# Patient Record
Sex: FEMALE | Race: ASIAN | NOT HISPANIC OR LATINO | URBAN - METROPOLITAN AREA
[De-identification: names, ages, dates, MRNs, and addresses within clinical notes are randomized per-mention and may not be internally consistent; named-entity substitution may affect disease eponyms.]

---

## 2021-11-21 ENCOUNTER — INPATIENT (INPATIENT)
Facility: HOSPITAL | Age: 86
LOS: 5 days | Discharge: HOME HEALTH | End: 2021-11-27
Attending: STUDENT IN AN ORGANIZED HEALTH CARE EDUCATION/TRAINING PROGRAM | Admitting: STUDENT IN AN ORGANIZED HEALTH CARE EDUCATION/TRAINING PROGRAM
Payer: MEDICARE

## 2021-11-21 VITALS
SYSTOLIC BLOOD PRESSURE: 163 MMHG | DIASTOLIC BLOOD PRESSURE: 61 MMHG | TEMPERATURE: 98 F | HEART RATE: 69 BPM | RESPIRATION RATE: 16 BRPM | OXYGEN SATURATION: 97 %

## 2021-11-21 DIAGNOSIS — E03.9 HYPOTHYROIDISM, UNSPECIFIED: ICD-10-CM

## 2021-11-21 DIAGNOSIS — Z29.9 ENCOUNTER FOR PROPHYLACTIC MEASURES, UNSPECIFIED: ICD-10-CM

## 2021-11-21 DIAGNOSIS — F03.90 UNSPECIFIED DEMENTIA WITHOUT BEHAVIORAL DISTURBANCE: ICD-10-CM

## 2021-11-21 DIAGNOSIS — E11.9 TYPE 2 DIABETES MELLITUS WITHOUT COMPLICATIONS: ICD-10-CM

## 2021-11-21 DIAGNOSIS — I10 ESSENTIAL (PRIMARY) HYPERTENSION: ICD-10-CM

## 2021-11-21 DIAGNOSIS — I63.9 CEREBRAL INFARCTION, UNSPECIFIED: ICD-10-CM

## 2021-11-21 DIAGNOSIS — N17.9 ACUTE KIDNEY FAILURE, UNSPECIFIED: ICD-10-CM

## 2021-11-21 DIAGNOSIS — I73.9 PERIPHERAL VASCULAR DISEASE, UNSPECIFIED: ICD-10-CM

## 2021-11-21 DIAGNOSIS — Z98.890 OTHER SPECIFIED POSTPROCEDURAL STATES: Chronic | ICD-10-CM

## 2021-11-21 DIAGNOSIS — R53.1 WEAKNESS: ICD-10-CM

## 2021-11-21 DIAGNOSIS — E87.5 HYPERKALEMIA: ICD-10-CM

## 2021-11-21 LAB
ANION GAP SERPL CALC-SCNC: 13 MMOL/L — SIGNIFICANT CHANGE UP (ref 7–14)
APPEARANCE UR: CLEAR — SIGNIFICANT CHANGE UP
BILIRUB UR-MCNC: NEGATIVE — SIGNIFICANT CHANGE UP
BUN SERPL-MCNC: 40 MG/DL — HIGH (ref 7–23)
CALCIUM SERPL-MCNC: 9.2 MG/DL — SIGNIFICANT CHANGE UP (ref 8.4–10.5)
CHLORIDE SERPL-SCNC: 105 MMOL/L — SIGNIFICANT CHANGE UP (ref 98–107)
CO2 SERPL-SCNC: 21 MMOL/L — LOW (ref 22–31)
COLOR SPEC: COLORLESS — SIGNIFICANT CHANGE UP
CREAT SERPL-MCNC: 1.66 MG/DL — HIGH (ref 0.5–1.3)
DIFF PNL FLD: NEGATIVE — SIGNIFICANT CHANGE UP
GLUCOSE BLDC GLUCOMTR-MCNC: 194 MG/DL — HIGH (ref 70–99)
GLUCOSE SERPL-MCNC: 191 MG/DL — HIGH (ref 70–99)
GLUCOSE UR QL: ABNORMAL
KETONES UR-MCNC: NEGATIVE — SIGNIFICANT CHANGE UP
LEUKOCYTE ESTERASE UR-ACNC: NEGATIVE — SIGNIFICANT CHANGE UP
NITRITE UR-MCNC: NEGATIVE — SIGNIFICANT CHANGE UP
PH UR: 7 — SIGNIFICANT CHANGE UP (ref 5–8)
POTASSIUM SERPL-MCNC: 5 MMOL/L — SIGNIFICANT CHANGE UP (ref 3.5–5.3)
POTASSIUM SERPL-SCNC: 5 MMOL/L — SIGNIFICANT CHANGE UP (ref 3.5–5.3)
PROT UR-MCNC: ABNORMAL
SARS-COV-2 RNA SPEC QL NAA+PROBE: SIGNIFICANT CHANGE UP
SODIUM SERPL-SCNC: 139 MMOL/L — SIGNIFICANT CHANGE UP (ref 135–145)
SP GR SPEC: 1.02 — SIGNIFICANT CHANGE UP (ref 1–1.05)
TROPONIN T, HIGH SENSITIVITY RESULT: 16 NG/L — SIGNIFICANT CHANGE UP
UROBILINOGEN FLD QL: SIGNIFICANT CHANGE UP

## 2021-11-21 PROCEDURE — 99223 1ST HOSP IP/OBS HIGH 75: CPT

## 2021-11-21 PROCEDURE — 71250 CT THORAX DX C-: CPT | Mod: 26

## 2021-11-21 PROCEDURE — 99285 EMERGENCY DEPT VISIT HI MDM: CPT | Mod: 25

## 2021-11-21 PROCEDURE — 70498 CT ANGIOGRAPHY NECK: CPT | Mod: 26,MA

## 2021-11-21 PROCEDURE — 71045 X-RAY EXAM CHEST 1 VIEW: CPT | Mod: 26

## 2021-11-21 PROCEDURE — 93010 ELECTROCARDIOGRAM REPORT: CPT

## 2021-11-21 PROCEDURE — 70496 CT ANGIOGRAPHY HEAD: CPT | Mod: 26,MA

## 2021-11-21 RX ORDER — NIFEDIPINE 30 MG
1 TABLET, EXTENDED RELEASE 24 HR ORAL
Qty: 0 | Refills: 0 | DISCHARGE

## 2021-11-21 RX ORDER — CILOSTAZOL 100 MG/1
50 TABLET ORAL
Refills: 0 | Status: DISCONTINUED | OUTPATIENT
Start: 2021-11-21 | End: 2021-11-21

## 2021-11-21 RX ORDER — ATORVASTATIN CALCIUM 80 MG/1
40 TABLET, FILM COATED ORAL AT BEDTIME
Refills: 0 | Status: DISCONTINUED | OUTPATIENT
Start: 2021-11-21 | End: 2021-11-27

## 2021-11-21 RX ORDER — SODIUM CHLORIDE 9 MG/ML
1000 INJECTION, SOLUTION INTRAVENOUS
Refills: 0 | Status: DISCONTINUED | OUTPATIENT
Start: 2021-11-21 | End: 2021-11-27

## 2021-11-21 RX ORDER — HEPARIN SODIUM 5000 [USP'U]/ML
5000 INJECTION INTRAVENOUS; SUBCUTANEOUS EVERY 8 HOURS
Refills: 0 | Status: DISCONTINUED | OUTPATIENT
Start: 2021-11-21 | End: 2021-11-27

## 2021-11-21 RX ORDER — SITAGLIPTIN 50 MG/1
1 TABLET, FILM COATED ORAL
Qty: 0 | Refills: 0 | DISCHARGE

## 2021-11-21 RX ORDER — NIFEDIPINE 30 MG
0 TABLET, EXTENDED RELEASE 24 HR ORAL
Qty: 0 | Refills: 0 | DISCHARGE

## 2021-11-21 RX ORDER — GLUCAGON INJECTION, SOLUTION 0.5 MG/.1ML
1 INJECTION, SOLUTION SUBCUTANEOUS ONCE
Refills: 0 | Status: DISCONTINUED | OUTPATIENT
Start: 2021-11-21 | End: 2021-11-27

## 2021-11-21 RX ORDER — DEXTROSE 50 % IN WATER 50 %
25 SYRINGE (ML) INTRAVENOUS ONCE
Refills: 0 | Status: DISCONTINUED | OUTPATIENT
Start: 2021-11-21 | End: 2021-11-27

## 2021-11-21 RX ORDER — CHOLECALCIFEROL (VITAMIN D3) 125 MCG
1 CAPSULE ORAL
Qty: 0 | Refills: 0 | DISCHARGE

## 2021-11-21 RX ORDER — LEVOTHYROXINE SODIUM 125 MCG
1 TABLET ORAL
Qty: 0 | Refills: 0 | DISCHARGE

## 2021-11-21 RX ORDER — METOPROLOL TARTRATE 50 MG
0 TABLET ORAL
Qty: 0 | Refills: 0 | DISCHARGE

## 2021-11-21 RX ORDER — CILOSTAZOL 100 MG/1
1 TABLET ORAL
Qty: 0 | Refills: 0 | DISCHARGE

## 2021-11-21 RX ORDER — LEVOTHYROXINE SODIUM 125 MCG
0 TABLET ORAL
Qty: 0 | Refills: 0 | DISCHARGE

## 2021-11-21 RX ORDER — SODIUM ZIRCONIUM CYCLOSILICATE 10 G/10G
10 POWDER, FOR SUSPENSION ORAL ONCE
Refills: 0 | Status: COMPLETED | OUTPATIENT
Start: 2021-11-21 | End: 2021-11-21

## 2021-11-21 RX ORDER — SITAGLIPTIN 50 MG/1
0 TABLET, FILM COATED ORAL
Qty: 0 | Refills: 0 | DISCHARGE

## 2021-11-21 RX ORDER — LEVOTHYROXINE SODIUM 125 MCG
112 TABLET ORAL DAILY
Refills: 0 | Status: DISCONTINUED | OUTPATIENT
Start: 2021-11-22 | End: 2021-11-27

## 2021-11-21 RX ORDER — DEXTROSE 50 % IN WATER 50 %
12.5 SYRINGE (ML) INTRAVENOUS ONCE
Refills: 0 | Status: DISCONTINUED | OUTPATIENT
Start: 2021-11-21 | End: 2021-11-27

## 2021-11-21 RX ORDER — PREGABALIN 225 MG/1
1 CAPSULE ORAL
Qty: 0 | Refills: 0 | DISCHARGE

## 2021-11-21 RX ORDER — DEXTROSE 50 % IN WATER 50 %
15 SYRINGE (ML) INTRAVENOUS ONCE
Refills: 0 | Status: DISCONTINUED | OUTPATIENT
Start: 2021-11-21 | End: 2021-11-27

## 2021-11-21 RX ORDER — INSULIN LISPRO 100/ML
VIAL (ML) SUBCUTANEOUS AT BEDTIME
Refills: 0 | Status: DISCONTINUED | OUTPATIENT
Start: 2021-11-21 | End: 2021-11-27

## 2021-11-21 RX ORDER — ATORVASTATIN CALCIUM 80 MG/1
80 TABLET, FILM COATED ORAL AT BEDTIME
Refills: 0 | Status: DISCONTINUED | OUTPATIENT
Start: 2021-11-21 | End: 2021-11-21

## 2021-11-21 RX ORDER — CILOSTAZOL 100 MG/1
0 TABLET ORAL
Qty: 0 | Refills: 0 | DISCHARGE

## 2021-11-21 RX ORDER — CILOSTAZOL 100 MG/1
50 TABLET ORAL
Refills: 0 | Status: DISCONTINUED | OUTPATIENT
Start: 2021-11-21 | End: 2021-11-27

## 2021-11-21 RX ORDER — PREGABALIN 225 MG/1
1000 CAPSULE ORAL DAILY
Refills: 0 | Status: DISCONTINUED | OUTPATIENT
Start: 2021-11-22 | End: 2021-11-27

## 2021-11-21 RX ORDER — CHOLECALCIFEROL (VITAMIN D3) 125 MCG
2000 CAPSULE ORAL DAILY
Refills: 0 | Status: DISCONTINUED | OUTPATIENT
Start: 2021-11-21 | End: 2021-11-27

## 2021-11-21 RX ORDER — INSULIN LISPRO 100/ML
VIAL (ML) SUBCUTANEOUS
Refills: 0 | Status: DISCONTINUED | OUTPATIENT
Start: 2021-11-21 | End: 2021-11-27

## 2021-11-21 RX ORDER — SODIUM CHLORIDE 9 MG/ML
1000 INJECTION INTRAMUSCULAR; INTRAVENOUS; SUBCUTANEOUS ONCE
Refills: 0 | Status: COMPLETED | OUTPATIENT
Start: 2021-11-21 | End: 2021-11-21

## 2021-11-21 RX ADMIN — SODIUM ZIRCONIUM CYCLOSILICATE 10 GRAM(S): 10 POWDER, FOR SUSPENSION ORAL at 14:18

## 2021-11-21 RX ADMIN — SODIUM CHLORIDE 1000 MILLILITER(S): 9 INJECTION INTRAMUSCULAR; INTRAVENOUS; SUBCUTANEOUS at 16:45

## 2021-11-21 RX ADMIN — ATORVASTATIN CALCIUM 40 MILLIGRAM(S): 80 TABLET, FILM COATED ORAL at 22:15

## 2021-11-21 RX ADMIN — SODIUM CHLORIDE 1000 MILLILITER(S): 9 INJECTION INTRAMUSCULAR; INTRAVENOUS; SUBCUTANEOUS at 14:19

## 2021-11-21 RX ADMIN — CILOSTAZOL 50 MILLIGRAM(S): 100 TABLET ORAL at 20:41

## 2021-11-21 RX ADMIN — HEPARIN SODIUM 5000 UNIT(S): 5000 INJECTION INTRAVENOUS; SUBCUTANEOUS at 22:15

## 2021-11-21 NOTE — ED PROVIDER NOTE - OBJECTIVE STATEMENT
85 yo F DM, HTN, dementia, hypothyroidism, not on ac/ap BIBEMS with granddaughter after fall to her right this AM. family had to pick her back up into her chair. she then had some RUE weakness and dropped her spoon. granddaughter then reports she "aspirated" her eyes rolled back and she didn't answer to her name for a few minutes. in ED pt at baseline per granddaughter and answering questions.

## 2021-11-21 NOTE — ED ADULT NURSE NOTE - BEFAST FACE
Patient to apply Chlorhexadine wipes  to surgical area (as instructed) the night before procedure and the AM of procedure. Wipes provided.    Blood bank bracelet applied to patient during Pre Admission Testing visit.  Patient instructed not to remove from arm until after procedure and they are discharged from the hospital.  Explained to patient that they may shower and get the bracelet wet, but not to immerse under water for longer periods (bathing, swimming, hand dishwashing, etc).  Patient verbalized understanding.    Patient states, she has never had Rhogam injection.     
Yes

## 2021-11-21 NOTE — ED PROVIDER NOTE - CLINICAL SUMMARY MEDICAL DECISION MAKING FREE TEXT BOX
reports R sided weakness, and droop, loc. resolved be time of eval. code stroke called. at baseline ms. plan: labs, ct, neuro following.

## 2021-11-21 NOTE — ED ADULT NURSE NOTE - NS PRO PASSIVE SMOKE EXP
INDICATION: Bilateral foot pain.



AP, oblique, and lateral views of both feet are obtained.



FINDINGS: No fracture or acute bony abnormality is seen. Joint

spaces appear unremarkable.



IMPRESSION:



Negative bilateral feet.



Dictated by: 



  Dictated on workstation # [id number] Unknown

## 2021-11-21 NOTE — ED ADULT NURSE REASSESSMENT NOTE - NS ED NURSE REASSESS COMMENT FT1
Received report from gosia Hicks. Pt. a&ox4 and in no acute distress. VS as noted. ACP made aware of BP. Patient denies any Chest pain, SOB, headaches, lightheadedness, dizziness, weakness, numbness, or tingling. Respirations appear even and unlabored. NSR on cardiac monitor. Patient turned and repositioned for comfort. Medications administered as per EMAR. Patient denies any complaints at this time. Awaiting further orders. Will continue to monitor. Received report from gosia Hicks. Pt. a&ox2 at baseline ambulatory w/ one assist. VS as noted. ACP made aware of BP. Patient denies any Chest pain, SOB, headaches, lightheadedness, dizziness, weakness, numbness, or tingling. Respirations appear even and unlabored. NSR on cardiac monitor. Patient turned and repositioned for comfort. Medications administered as per EMAR. Patient denies any complaints at this time. Awaiting further orders. Will continue to monitor.

## 2021-11-21 NOTE — H&P ADULT - NSICDXPASTSURGICALHX_GEN_ALL_CORE_FT
PAST SURGICAL HISTORY:  H/O surgical procedure Right foot ORIF with metal natacha and screws in 2011

## 2021-11-21 NOTE — CONSULT NOTE ADULT - ASSESSMENT
Patient is a 85yo F RH PMHx of DM, HTN, dementia, hypothyroidism, not on ac/ap who presents to ED for R facial droop, word finding difficulty and RUE weakness    LKW: 9:45 AM 11/21/21  NIHSS: 2   Baseline MRS: 2    Not a tPA candidate due to resolving and nondisabling deficits.   Not a thrombectomy candidate due based on NIHSS    CT head showed:     Impression: symptoms 2/2 occlusion  Also highly suspicious for seizure like event.     Mechanism: ESUS    Recommendations:  [] Aspirin 81mg daily for now  [] Atorvastatin 80 mg daily (long-term goal and titrate to LDL < 70)  [] HgbA1C, fasting lipid panel, CBC, CMP, coag panel, troponin  [] MRI brain w/o con   [] TTE w/ bubble study  [] telemetry to check for arrhythmia, EKG,   [] routine EEG     - Tight glucose control (long-term goal HgbA1c < 6%)  - Neuro-checks and VS q4h  - Permissive HTN up to 220/120 for 24-48h from symptom onset  - Dysphagia screen. If fails, speech/swallow eval  - aspiration precautions  - STAT CT head non-contrast for change in neuro exam.   - PT/ OT / CM/ SW evaluations  - DVT ppx     Discussed with stroke attending Dr Rg Phillip regarding decision against candidacy for tPA/ thrombectomy. Will be formally staffed on morning rounds with attending. Recommendations will be complete once signed by attending. Patient is a 87yo F RH PMHx of DM, HTN, dementia, hypothyroidism, not on ac/ap who presents to ED for R facial droop, word finding difficulty and RUE weakness and during event became minimally responsive and with eyes rolling back. Unable to determine if new incontinence given wears diaper. No tongue biting noted. No new seizure tonic/ clonic activity noted. Has tremors at baseline. /61. CBC w/ anemia. Pending remainder of labs.    LKW: 9:45 AM 11/21/21  NIHSS: 2   Baseline MRS: 2    Not a tPA candidate due to resolving and nondisabling deficits.   Not a thrombectomy candidate due based on NIHSS    CT head showed to my eye possible chronic infarct in L centrum semiovale.     Impression: Patient with unresponsiveness, R facial droop and RUE weakness, word finding difficulty all of which have or are resolving concerning for seizure like activity vs L hemispheric dysfunction from ischemic event. Mechanism: ESUS    Recommendations:  [] Aspirin 81mg daily for now  [] Atorvastatin 80 mg daily (long-term goal and titrate to LDL < 70)  [] HgbA1C, fasting lipid panel, CBC, CMP, coag panel, troponin  [] MRI brain w/o con   [] TTE w/ bubble study  [] telemetry to check for arrhythmia, EKG,   [] vEEG 24 hr     - Tight glucose control (long-term goal HgbA1c < 6%)  - Neuro-checks and VS q4h  - Permissive HTN up to 220/120 for 24-48h from symptom onset  - Dysphagia screen. If fails, speech/swallow eval  - aspiration precautions  - STAT CT head non-contrast for change in neuro exam.   - PT/ OT / CM/ SW evaluations  - DVT ppx     Discussed with stroke attending Dr Rg Phillip regarding decision against candidacy for tPA/ thrombectomy. Will be formally staffed on morning rounds with attending. Recommendations will be complete once signed by attending. Patient is a 87yo F RH PMHx of DM, HTN, dementia, hypothyroidism, not on ac/ap who presents to ED for R facial droop, word finding difficulty and RUE weakness and during event became minimally responsive and with eyes rolling back. Unable to determine if new incontinence given wears diaper. No tongue biting noted. No new seizure tonic/ clonic activity noted. Has tremors at baseline. /61. CBC w/ anemia. Pending remainder of labs. Hyperkalemia, elevated Cr. Exam w/ subtle facial droop.     LKW: 9:45 AM 11/21/21  NIHSS: 2   Baseline MRS: 2    Not a tPA candidate due to resolving and nondisabling deficits.   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   CTA: Atherosclerotic plaque resulting in [id number]% stenosis of the proximal right ICA and 60% stenosis of the proximal left ICA. Bilateral vertebral arteries are without stenosis. No LVO. 4mm R cavernous ICA aneurysm. Moderate stenosis of the distal right cavernous ICA.    Impression: Patient with unresponsiveness, R facial droop and RUE weakness, word finding difficulty all of which have or are resolving concerning for seizure like activity vs L hemispheric dysfunction from ischemic event. Mechanism: ESUS.     Recommendations:  [] Aspirin 81mg daily for now   [] Atorvastatin 40 mg daily (long-term goal and titrate to LDL < 70)  [] HgbA1C, fasting lipid panel, CBC, CMP, coag panel, troponin  [] MRI brain w/o con   [] TTE w/ bubble study  [] telemetry to check for arrhythmia, EKG,   [] vEEG 24 hr   [] will eventually need outpt follow up for aneurysm R cavernous ICA     - Tight glucose control (long-term goal HgbA1c < 6%)  - Neuro-checks and VS q4h  - Permissive HTN up to 220/120 for 24-48h from symptom onset  - Dysphagia screen. If fails, speech/swallow eval  - aspiration precautions  - STAT CT head non-contrast for change in neuro exam.   - PT/ OT / CM/ SW evaluations  - DVT ppx as per primary team    Discussed with stroke attending Dr Rg Phillip regarding decision against candidacy for tPA/ thrombectomy. Will be formally staffed on morning rounds with attending. Recommendations will be complete once signed by attending. Patient is a 85yo F RH PMHx of DM, HTN, dementia, hypothyroidism, on cilostazol who presents to ED for R facial droop, word finding difficulty and RUE weakness and during event became minimally responsive and with eyes rolling back. Unable to determine if new incontinence given wears diaper. No tongue biting noted. No new seizure tonic/ clonic activity noted. Has tremors at baseline. /61. CBC w/ anemia. Pending remainder of labs. Hyperkalemia, elevated Cr. Exam w/ subtle facial droop.     LKW: 9:45 AM 11/21/21  NIHSS: 2   Baseline MRS: 2    Not a tPA candidate due to resolving and nondisabling deficits.   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   CTA: Atherosclerotic plaque resulting in [id number]% stenosis of the proximal right ICA and 60% stenosis of the proximal left ICA. Bilateral vertebral arteries are without stenosis. No LVO. 4mm R cavernous ICA aneurysm. Moderate stenosis of the distal right cavernous ICA.    Impression: Patient with unresponsiveness, R facial droop and RUE weakness, word finding difficulty all of which have or are resolving concerning for seizure like activity vs L hemispheric dysfunction from ischemic event. Mechanism: ESUS.     Recommendations:  [] Continue with cilostazol for now. Will determine if aspirin 81mg will be added based on MRI results to determine if acute stroke has occurred.   [] Atorvastatin 40 mg daily (long-term goal and titrate to LDL < 70)  [] HgbA1C, fasting lipid panel, CBC, CMP, coag panel, troponin  [] MRI brain w/o con   [] TTE w/ bubble study  [] telemetry to check for arrhythmia, EKG,   [] vEEG 24 hr   [] will eventually need outpt follow up for aneurysm R cavernous ICA     - Tight glucose control (long-term goal HgbA1c < 6%)  - Neuro-checks and VS q4h  - Permissive HTN up to 220/120 for 24-48h from symptom onset  - Dysphagia screen. If fails, speech/swallow eval  - aspiration precautions  - STAT CT head non-contrast for change in neuro exam.   - PT/ OT / CM/ SW evaluations  - DVT ppx as per primary team    Discussed with stroke attending Dr Rg Phillip regarding decision against candidacy for tPA/ thrombectomy. Will be formally staffed on morning rounds with attending. Recommendations will be complete once signed by attending.

## 2021-11-21 NOTE — ED ADULT TRIAGE NOTE - CHIEF COMPLAINT QUOTE
Pt granddaughter states that pt awoke in her usual state of health and was eating breakfast at approx 11am when she dropped her fork and started to choke, at that time family noticed a right sided facial droop, which improved after approx 8 min.  Pt arrived in ED still with slight right facial droop, not speaking with slight right UE drift.  Code stroke called,  PMH DM2, HTN

## 2021-11-21 NOTE — H&P ADULT - NSHPLABSRESULTS_GEN_ALL_CORE
CT Head w/o con: No CT evidence of acute intracranial hemorrhage, or acute transcortical infarct. 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  CT angiography neck: Atherosclerotic plaque resulting in approximately 50% stenosis of the proximal right ICA and 60% stenosis of the proximal left ICA. Bilateral vertebral arteries are without stenosis.  CT angiography brain: No large vessel occlusion. 4 mm right cavernous ICA aneurysm. Moderate stenosis of the distal right cavernous ICA.  CXR: Left lower lung field retrocardiac opacity.    Trop: 16  EKG: Sinus Rhythm @ 81bpm QTC 448ms  BUN/Cr: 49/2.10  UA: proteinuria and glucosuria. Neg leuks/nitrates.

## 2021-11-21 NOTE — H&P ADULT - ASSESSMENT
85yo F RH PMHx of DM2, HTN, Advanced Dementia, PAD (on pletal,) Hypothyroidism, and Hx of RLE DVT (in 2011 off AC) p/w fall this morning at around 10:30am followed by unresponsiveness, slurred speech and R facial droop, admitted to tele for Stroke.

## 2021-11-21 NOTE — ED PROVIDER NOTE - NS ED ROS FT
Constitutional: No fever. no chills.   Eyes: No visual changes, eye pain or redness  HEENT: No throat pain, hearing changes, ear pain,   CV: No chest pain, no palpitations  Resp: No shortness of breath, no cough  GI: No abdominal pain. No nausea. no  vomiting. No diarrhea.   : No dysuria, hematuria.   MSK: No musculoskeletal pain  Skin: No rash, lesions, no bruises  Neuro: No headache. No numbness or tingling. No weakness. No dizziness.  Allergy/Immunology: no allergy to medicine

## 2021-11-21 NOTE — H&P ADULT - NSICDXPASTMEDICALHX_GEN_ALL_CORE_FT
PAST MEDICAL HISTORY:  Dementia     DM2 (diabetes mellitus, type 2)     History of deep venous thrombosis (DVT) of distal vein of right lower extremity in 2011 (used to be on full AC)    HTN (hypertension)     Hypothyroidism     PAD (peripheral artery disease)

## 2021-11-21 NOTE — CONSULT NOTE ADULT - ATTENDING COMMENTS
87yo F RH PMHx of DM, HTN, dementia, hypothyroidism, on cilostazol who presents to ED for R facial droop, word finding difficulty and RUE weakness and during event became minimally responsive and with eyes rolling back.now at baseline, cognitive defiicts AA0 x1      Concern for seizure  vs TIA    continue with plan aove

## 2021-11-21 NOTE — CONSULT NOTE ADULT - SUBJECTIVE AND OBJECTIVE BOX
Neurology Consultation  Johnny Perez, PGY-2      HPI: Patient is a 85yo F RH PMHx of DM, HTN, dementia, hypothyroidism, not on ac/ap who presents to ED for R facial droop, word finding difficulty and RUE weakness. Accompanied by granddaughter who states patient LKN 9:45AM 11/21/21. Then around 11AM while at breakfast she was noted to drop her fork, became unresponsive for 7 minutes, had her head move forward then back with eyes rolled back. Also noted with R side of face drooping. Triage nurse noted RUE drift as well. Not on ac/ap. Baseline able to shower  brush teeth and use restroom mostly herself. walks with cane.      (Stroke only)  NIHSS: 2  MRS: 2     PAST MEDICAL & SURGICAL HISTORY: DM HTN hypothyroidism    FAMILY HISTORY:    SOCIAL HISTORY: no smoking, alcohol, drug use hx    MEDICATIONS (HOME):  Home Medications: synthroid, metoprolol, cilostazol, januvia, nifedipine    MEDICATIONS  (STANDING):    MEDICATIONS  (PRN):    ALLERGIES/INTOLERANCES:  Allergies  Allergy Status Unknown    Intolerances    VITALS & EXAMINATION:  Vital Signs Last 24 Hrs  T(C): 36.9 (21 Nov 2021 12:03), Max: 36.9 (21 Nov 2021 12:03)  T(F): 98.4 (21 Nov 2021 12:03), Max: 98.4 (21 Nov 2021 12:03)  HR: 69 (21 Nov 2021 12:03) (69 - 69)  BP: 163/61 (21 Nov 2021 12:03) (163/61 - 163/61)  BP(mean): --  RR: 16 (21 Nov 2021 12:03) (16 - 16)  SpO2: 97% (21 Nov 2021 12:03) (97% - 97%)    General:  Constitutional: Female, appears stated age, in no apparent distress including pain  Head: Normocephalic & atraumatic; Eyes: clear sclera; Neck: supple  Extremities: No cyanosis, clubbing, or edema; Skin: No rashes, bruising, or discoloration.  Resp: breathing comfortably, normal rate    Neurological (>12):  MS: Awake, alert, oriented to person, place, situation, time. Normal affect. Follows all commands. Cooperative.   Language: Speech is clear, fluent, intact with normal tone/rate/ volume and good repetition,  comprehension, registration of words. No perseverance.     CNs: PERRL (R = 3mm, L = 3mm). VFF. EOMI no nystagmus. V1-3 intact to LT, well developed masseter muscles b/l. No facial asymmetry b/l, full eye closure strength b/l. Hearing grossly normal (rubbing fingers) b/l.  Gag reflex deferred.     Motor: Normal muscle bulk & tone. No noticeable tremor or seizure. No pronator drift.              Deltoid	Biceps	Triceps	   R	5	5	5	5		 	  L	5	5	5	5			  	H-Flex	K-Ext	D-Flex	P-Flex  R	5	5	5	5			 	   L	5	5	5	5		     Sensation: Intact to LT/PP/Temp/Vibration/Position b/l., Cortical: Extinction on DSS (neglect): none  Reflexes:              Biceps(C5)       BR(C6)     Triceps(C7)               Patellar(L4)        Plantar Resp  R	2	          2	             2		        2		    	Down   L	2	          2	             2		        2		 	Down     Coordination: No dysmetria to FTN  Gait: Normal Romberg. No postural instability. Normal stance and tandem gait.     LABORATORY:  CBC   Chem     LFTs   Coagulopathy   Lipid Panel   A1c   Cardiac enzymes     U/A   CSF  Other    STUDIES & IMAGING: (EEG, CT, MR, U/S, TTE/BETTY): Neurology Consultation  Johnny Perez, PGY-2      HPI: Patient is a 85yo F RH PMHx of DM, HTN, dementia, hypothyroidism, not on ac/ap who presents to ED for R facial droop, word finding difficulty and RUE weakness. Accompanied by granddaughter who states patient LKN 9:45AM 11/21/21. Then around 11AM while at breakfast she was noted to drop her fork, became unresponsive for 7 minutes, had her head move forward then back with eyes rolled back. Also noted with R side of face drooping which prompted concern for code stroke. Nurse also noted RUE drift as well on arrival. Not on ac/ap. Baseline able to shower brush teeth and use restroom mostly herself. walks with cane.  Facial droop and mental status improved prior to and on arrival to ED. Facial droop now very subtle.     (Stroke only)  NIHSS: 2  MRS: 2     PAST MEDICAL & SURGICAL HISTORY: DM HTN hypothyroidism    FAMILY HISTORY:    SOCIAL HISTORY: no smoking, alcohol, drug use hx    MEDICATIONS (HOME):  Home Medications: synthroid, metoprolol, cilostazol, januvia, nifedipine    MEDICATIONS  (STANDING):    MEDICATIONS  (PRN):    ALLERGIES/INTOLERANCES:  Allergies  Allergy Status Unknown    Intolerances    VITALS & EXAMINATION:  Vital Signs Last 24 Hrs  T(C): 36.9 (21 Nov 2021 12:03), Max: 36.9 (21 Nov 2021 12:03)  T(F): 98.4 (21 Nov 2021 12:03), Max: 98.4 (21 Nov 2021 12:03)  HR: 69 (21 Nov 2021 12:03) (69 - 69)  BP: 163/61 (21 Nov 2021 12:03) (163/61 - 163/61)  BP(mean): --  RR: 16 (21 Nov 2021 12:03) (16 - 16)  SpO2: 97% (21 Nov 2021 12:03) (97% - 97%)    General:  Constitutional: Female, appears stated age, in no apparent distress including pain, hard of hearing  Head: Normocephalic & atraumatic; Eyes: clear sclera;    Resp: breathing comfortably, normal rate    Neurological (>12):  MS: Awake, alert, oriented to person, place, situation, not time. Normal affect. Follows all commands. Cooperative.   Language: Speech is currently clear, fluent per grandudaughter. Speaks minimally 2/2 dementia.      CNs: PERRL (R = 3mm, L = 3mm). VFF. EOMI no nystagmus. V1-3 intact to LT, well developed masseter muscles b/l. subtle R facial droop. full eye closure strength b/l. Hearing grossly normal (rubbing fingers) b/l.  Gag reflex deferred.     Motor: Normal muscle bulk & tone. No noticeable tremor or seizure. No pronator drift.              Deltoid	Biceps	Triceps	   R	5	5	5	5		 	  L	5	5	5	5			  	H-Flex	K-Ext	D-Flex	P-Flex  R	5	5	5	5			 	   L	5	5	5	5		     Sensation: Intact to LT b/l.,       Coordination: tremor in L hand but less c/w dysmetria     LABORATORY:  CBC   Chem     LFTs   Coagulopathy   Lipid Panel   A1c   Cardiac enzymes     U/A   CSF  Other    STUDIES & IMAGING: (EEG, CT, MR, U/S, TTE/BETTY):     Neurology Consultation  Johnny Perez, PGY-2      HPI: Patient is a 87yo F RH PMHx of DM, HTN, dementia, hypothyroidism, not on ac/ap who presents to ED for R facial droop, word finding difficulty and RUE weakness. Accompanied by granddaughter who states patient LKN 9:45AM 11/21/21. Then around 11AM while at breakfast she was noted to drop her fork, became unresponsive for 7 minutes, had her head move forward then back with eyes rolled back. Also noted with R side of face drooping which prompted concern for code stroke. Nurse also noted RUE drift as well on arrival. Not on ac/ap. Baseline able to shower brush teeth and use restroom mostly herself. walks with cane.  Facial droop and mental status improved prior to and on arrival to ED. Facial droop now very subtle.     (Stroke only)  NIHSS: 2  MRS: 2     PAST MEDICAL & SURGICAL HISTORY: DM HTN hypothyroidism    SOCIAL HISTORY: no smoking, alcohol, drug use hx    MEDICATIONS (HOME):  Home Medications: synthroid, metoprolol, cilostazol, januvia, nifedipine    MEDICATIONS  (STANDING):    MEDICATIONS  (PRN):    ALLERGIES/INTOLERANCES:  Allergies  Allergy Status Unknown    Intolerances    VITALS & EXAMINATION:  Vital Signs Last 24 Hrs  T(C): 36.9 (21 Nov 2021 12:03), Max: 36.9 (21 Nov 2021 12:03)  T(F): 98.4 (21 Nov 2021 12:03), Max: 98.4 (21 Nov 2021 12:03)  HR: 69 (21 Nov 2021 12:03) (69 - 69)  BP: 163/61 (21 Nov 2021 12:03) (163/61 - 163/61)  BP(mean): --  RR: 16 (21 Nov 2021 12:03) (16 - 16)  SpO2: 97% (21 Nov 2021 12:03) (97% - 97%)    General:  Constitutional: Female, appears stated age, in no apparent distress including pain, hard of hearing  Head: Normocephalic & atraumatic; Eyes: clear sclera;    Resp: breathing comfortably, normal rate    Neurological (>12):  MS: Awake, alert, oriented to person, place, situation, not time. Normal affect. Follows all commands. Cooperative.   Language: Speech is currently clear, fluent per grandudaughter. Speaks minimally 2/2 dementia.  Able to repeat, comprehend.    CNs: PERRL (R = 3mm, L = 3mm). VFF. EOMI no nystagmus. V1-3 intact to LT, well developed masseter muscles b/l. subtle R facial droop. full eye closure strength b/l. Hearing grossly normal (rubbing fingers) b/l.  Gag reflex deferred.     Motor: Normal muscle bulk & tone. No noticeable tremor or seizure. No pronator drift.              Deltoid	Biceps	Triceps	   R	5	5	5	5		 	  L	5	5	5	5			  	H-Flex	K-Ext	D-Flex	P-Flex  R	5	5	5	5			 	   L	5	5	5	5		     Sensation: Intact to LT b/l.,     Coordination: tremor in L hand but less c/w dysmetria     LABORATORY:  CBC   Chem     LFTs   Coagulopathy   Lipid Panel   A1c   Cardiac enzymes     U/A   CSF  Other    STUDIES & IMAGING: (EEG, CT, MR, U/S, TTE/BETTY):    < from: CT Angio Neck w/ IV Cont (11.21.21 @ 12:32) >    EXAM:  CT ANGIO NECK (W)AW IC      EXAM:  CT ANGIO BRAIN (W)AW IC        PROCEDURE DATE:  Nov 21 2021     INTERPRETATION:  CLINICAL INFORMATION: Stroke code. Right-sided facial droop with choking.    TECHNIQUE: Contrast enhanced CT angiography of the neck and head was performed.  MIP reformats were generated in the coronal, sagittal and axial planes. Intravenous contrast: 90 cc Omnipaque 350 were administered; 10 cc were discarded    COMPARISON: Same day CT head.    FINDINGS:    CT ANGIOGRAPHY NECK:    Three-vessel aortic arch. The origins of the great vessels are without stenosis. The common carotid arteries are normal in caliber without significant stenosis.    Atherosclerotic plaque at bilateral carotid bifurcations. There is approximately 50% stenosis of the proximal RIGHT ICA. There is approximately 60% stenosis of the proximal LEFT ICA. Remainder of the bilateral cervical ICAs are without stenosis.    Co-dominant vertebral arteries. The vertebral arteries are normal in caliberwithout significant stenosis.    CT ANGIOGRAPHY BRAIN:    Atherosclerotic changes involving the bilateral distal ICAs. 4 x 3 mm laterally oriented aneurysm arising from the right cavernous ICA. Moderate stenosis of the right distal cavernous ICA.    Bilateral proximal cerebral arteries are without stenosis. Fetal predisposition of the left PCA.    The distal vertebral arteries, basilar artery, and bilateral posterior cerebral arteries are patent without evidence of significant stenosis, major vessel occlusion, or aneurysm.    No evidence for arteriovenous malformation.    Visualized portions of the superficial and deep venous systems are unremarkable.      IMPRESSION:    CT angiography neck:  -Atherosclerotic plaque resulting in [id number]% stenosis of the proximal right ICA and 60% stenosis of the proximal left ICA.  -Bilateral vertebral arteries are without stenosis.    CT angiography brain:  -No large vessel occlusion.  -4 mm right cavernous ICA aneurysm.  -Moderate stenosis of the distal right cavernous ICA.    --- End of Report ---    VIVIENNE MARTIN MD; Resident Radiology  This document has been electronically signed.  LINSEY FISCHER MD; Attending Radiologist  This document has been electronically signed. Nov 21 20211:40PM    < end of copied text >    < from: CT Brain Stroke Protocol (11.21.21 @ 12:32) >    EXAM:  CT BRAIN STROKE PROTOCOL        PROCEDURE DATE:  Nov 21 2021         INTERPRETATION:  CLINICAL INFORMATION: Stroke code. Dropped her fork while eating and started to choke, with right-sided facial droop which improved after approximately 8 minutes. Not speaking.    TECHNIQUE: Noncontrast axial CT images were acquired through the head. Two-dimensional sagittal and coronal reformats were generated.    COMPARISON: None    FINDINGS:    No acute intracranial hemorrhage, mass effect, or midline shift. No abnormal extra-axial collections. The basal cisterns are patent without evidence of central herniation. There is a chronic infarct in the left centrum semiovale.    Mild to moderate patchy periventricular white matter hypoattenuation, likely the sequela of chronic microvascular changes. No hydrocephalus.    The calvarium is intact. The soft tissues of the scalp are unremarkable. The visualized paranasal sinuses are clear. The mastoid air cells and middle ear cavities are clear. Lens replacements.      IMPRESSION:    No CT evidence of acute intracranial hemorrhage, or acute transcortical infarct.  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    These findings were discussed with Dr. Perez at 11/21/2021 12:41 PM by Dr. Martin with read back confirmation.    --- End of Report ---    VIVIENNE MARTIN MD; Resident Radiology  This document has been electronically signed.  LINSEY FISCHER MD; Attending Radiologist  This document has been electronically signed. Nov 21 2021 12:49PM    < end of copied text >     Neurology Consultation  Johnny Perez, PGY-2      HPI: Patient is a 87yo F RH PMHx of DM, HTN, dementia, hypothyroidism, on cilostazol who presents to ED for R facial droop, word finding difficulty and RUE weakness. Accompanied by granddaughter who states patient LKN 9:45AM 11/21/21. Then around 11AM while at breakfast she was noted to drop her fork, became unresponsive for 7 minutes, had her head move forward then back with eyes rolled back. Also noted with R side of face drooping which prompted concern for code stroke. Nurse also noted RUE drift as well on arrival. Not on ac/ap. Baseline able to shower brush teeth and use restroom mostly herself. walks with cane.  Facial droop and mental status improved prior to and on arrival to ED. Facial droop now very subtle.     (Stroke only)  NIHSS: 2  MRS: 2     PAST MEDICAL & SURGICAL HISTORY: DM HTN hypothyroidism    SOCIAL HISTORY: no smoking, alcohol, drug use hx    MEDICATIONS (HOME):  Home Medications: synthroid, metoprolol, cilostazol, januvia, nifedipine    MEDICATIONS  (STANDING):    MEDICATIONS  (PRN):    ALLERGIES/INTOLERANCES:  Allergies  Allergy Status Unknown    Intolerances    VITALS & EXAMINATION:  Vital Signs Last 24 Hrs  T(C): 36.9 (21 Nov 2021 12:03), Max: 36.9 (21 Nov 2021 12:03)  T(F): 98.4 (21 Nov 2021 12:03), Max: 98.4 (21 Nov 2021 12:03)  HR: 69 (21 Nov 2021 12:03) (69 - 69)  BP: 163/61 (21 Nov 2021 12:03) (163/61 - 163/61)  BP(mean): --  RR: 16 (21 Nov 2021 12:03) (16 - 16)  SpO2: 97% (21 Nov 2021 12:03) (97% - 97%)    General:  Constitutional: Female, appears stated age, in no apparent distress including pain, hard of hearing  Head: Normocephalic & atraumatic; Eyes: clear sclera;    Resp: breathing comfortably, normal rate    Neurological (>12):  MS: Awake, alert, oriented to person, place, situation, not time. Normal affect. Follows all commands. Cooperative.   Language: Speech is currently clear, fluent per grandudaughter. Speaks minimally 2/2 dementia.  Able to repeat, comprehend.    CNs: PERRL (R = 3mm, L = 3mm). VFF. EOMI no nystagmus. V1-3 intact to LT, well developed masseter muscles b/l. subtle R facial droop. full eye closure strength b/l. Hearing grossly normal (rubbing fingers) b/l.  Gag reflex deferred.     Motor: Normal muscle bulk & tone. No noticeable tremor or seizure. No pronator drift.              Deltoid	Biceps	Triceps	   R	5	5	5	5		 	  L	5	5	5	5			  	H-Flex	K-Ext	D-Flex	P-Flex  R	5	5	5	5			 	   L	5	5	5	5		     Sensation: Intact to LT b/l.,     Coordination: tremor in L hand but less c/w dysmetria     LABORATORY:  CBC   Chem     LFTs   Coagulopathy   Lipid Panel   A1c   Cardiac enzymes     U/A   CSF  Other    STUDIES & IMAGING: (EEG, CT, MR, U/S, TTE/BETTY):    < from: CT Angio Neck w/ IV Cont (11.21.21 @ 12:32) >    EXAM:  CT ANGIO NECK (W)AW IC      EXAM:  CT ANGIO BRAIN (W)AW IC        PROCEDURE DATE:  Nov 21 2021     INTERPRETATION:  CLINICAL INFORMATION: Stroke code. Right-sided facial droop with choking.    TECHNIQUE: Contrast enhanced CT angiography of the neck and head was performed.  MIP reformats were generated in the coronal, sagittal and axial planes. Intravenous contrast: 90 cc Omnipaque 350 were administered; 10 cc were discarded    COMPARISON: Same day CT head.    FINDINGS:    CT ANGIOGRAPHY NECK:    Three-vessel aortic arch. The origins of the great vessels are without stenosis. The common carotid arteries are normal in caliber without significant stenosis.    Atherosclerotic plaque at bilateral carotid bifurcations. There is approximately 50% stenosis of the proximal RIGHT ICA. There is approximately 60% stenosis of the proximal LEFT ICA. Remainder of the bilateral cervical ICAs are without stenosis.    Co-dominant vertebral arteries. The vertebral arteries are normal in caliberwithout significant stenosis.    CT ANGIOGRAPHY BRAIN:    Atherosclerotic changes involving the bilateral distal ICAs. 4 x 3 mm laterally oriented aneurysm arising from the right cavernous ICA. Moderate stenosis of the right distal cavernous ICA.    Bilateral proximal cerebral arteries are without stenosis. Fetal predisposition of the left PCA.    The distal vertebral arteries, basilar artery, and bilateral posterior cerebral arteries are patent without evidence of significant stenosis, major vessel occlusion, or aneurysm.    No evidence for arteriovenous malformation.    Visualized portions of the superficial and deep venous systems are unremarkable.      IMPRESSION:    CT angiography neck:  -Atherosclerotic plaque resulting in [id number]% stenosis of the proximal right ICA and 60% stenosis of the proximal left ICA.  -Bilateral vertebral arteries are without stenosis.    CT angiography brain:  -No large vessel occlusion.  -4 mm right cavernous ICA aneurysm.  -Moderate stenosis of the distal right cavernous ICA.    --- End of Report ---    VIVIENNE MARTIN MD; Resident Radiology  This document has been electronically signed.  LINSEY FISCHER MD; Attending Radiologist  This document has been electronically signed. Nov 21 20211:40PM    < end of copied text >    < from: CT Brain Stroke Protocol (11.21.21 @ 12:32) >    EXAM:  CT BRAIN STROKE PROTOCOL        PROCEDURE DATE:  Nov 21 2021         INTERPRETATION:  CLINICAL INFORMATION: Stroke code. Dropped her fork while eating and started to choke, with right-sided facial droop which improved after approximately 8 minutes. Not speaking.    TECHNIQUE: Noncontrast axial CT images were acquired through the head. Two-dimensional sagittal and coronal reformats were generated.    COMPARISON: None    FINDINGS:    No acute intracranial hemorrhage, mass effect, or midline shift. No abnormal extra-axial collections. The basal cisterns are patent without evidence of central herniation. There is a chronic infarct in the left centrum semiovale.    Mild to moderate patchy periventricular white matter hypoattenuation, likely the sequela of chronic microvascular changes. No hydrocephalus.    The calvarium is intact. The soft tissues of the scalp are unremarkable. The visualized paranasal sinuses are clear. The mastoid air cells and middle ear cavities are clear. Lens replacements.      IMPRESSION:    No CT evidence of acute intracranial hemorrhage, or acute transcortical infarct.  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    These findings were discussed with Dr. Perez at 11/21/2021 12:41 PM by Dr. Martin with read back confirmation.    --- End of Report ---    VIVIENNE MARTIN MD; Resident Radiology  This document has been electronically signed.  LINSEY FISCHER MD; Attending Radiologist  This document has been electronically signed. Nov 21 2021 12:49PM    < end of copied text >

## 2021-11-21 NOTE — H&P ADULT - NSHPSOCIALHISTORY_GEN_ALL_CORE
Pt , lives with daughter and granddaughter. Denies smoking, drinking or drugs. At baseline: ambulates with either a cane or walker inside the house only.

## 2021-11-21 NOTE — ED PROVIDER NOTE - PHYSICAL EXAMINATION
GEN: no acute respiratory distress. nontoxic, speaking comfortably in full sentences  HEENT: NCAT. face symmetrical. PERRL 3mm, EOMI, MMM, oropharynx wnl.  Neck: no JVD, trachea midline, no LAD  CV: RRR. +S1S2, no murmur. 2+ pulses in 4 extremities,   Chest: CTA B/l. no wheezing, rales, rhonchi. no retractions. good air movement.   ABD: +BS, soft, non distended, non tender. No guarding/rebound.   : no cva or suprapubic tenderness  MSK: No clubbing, cyanosis, edema. FROM of all extremities. no tenderness to palpation. No midline or paraspinal tenderness. no spinal step-offs.  Neuro: AAOx2. CN 2-12 intact; Sensation intact, motor 5/5 throughout.   SKIN: No rash

## 2021-11-21 NOTE — H&P ADULT - ATTENDING COMMENTS
85yo F RH PMHx of DM2 (on Januvia), HTN, Advanced Dementia, PAD (on pletal,) Hypothyroidism, and Hx of RLE DVT (in 2011 off AC) who presented after an unwitnessed fall from standing this am followed by R facial droop and dysarthria. Granddaughter able to provide collateral information. Code stroke called on patient in the ED. Neuro eval.  CT Head w/o con: No CT evidence of acute intracranial hemorrhage, or acute transcortical infarct. 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 CT angiography neck: Atherosclerotic plaque resulting in approximately 50% stenosis of the proximal right ICA and 60% stenosis of the proximal left ICA. Bilateral vertebral arteries are without stenosis.    - F/u MRI brain  - Can cont pletal (holding off on aspirin since she is already on pletal) and dvt ppx as suspicion for hemorrhagic stroke is low based on imaging; continue lipitor  - TTE w bubble study  - q4 hr neuro checks  - concern for aspiration during these events, therefore CT chest obtained. CXR notable for retrocardiac opacity, though study quality was poor  - Speech eval  - repeat BMP to trend K after [facility]  - Neuro consulted, appreciate rec's  - PT/OT in am

## 2021-11-21 NOTE — H&P ADULT - HISTORY OF PRESENT ILLNESS
85yo F RH PMHx of DM2, HTN, Advanced Dementia, PAD (on pletal,) Hypothyroidism, and Hx of RLE DVT (in 2011 off AC) p/w fall this morning at around 10:30am. Pt's baseline: Alert and Confused where at times she doesn't recognizes her daughter, ambulates with either a cane or walker inside the house only. Pt was in normal state of health this morning where she took her shower independently and brushed her teeth, and had breakfast. At around 10:30am pt fell, unwitnessed by family, but family was in the same room. Granddaughter came to the pt to help and pt said "oh I fell on the floor." Family stood her up and was sat on the chair. Daughter gave a cup of tea with crackers. At 11am while pt was eating and drinking pt dropped the spoon from R hand and developed R facial droop. Pt also became unresponsive, starring in one direction. Then, pt leaned back and her eyes rolled back to the head for about 10 seconds. EMS arrived and pt's LOC returned back slowly. Unknown incontinence due to pt baseline incontinent, wearing diaper. Granddaughter also reported after pt leaned back there was questionable "choking' sound for few seconds. No reported weakness, vision changes, chest pain, sob, palpitations, diaphoresis, nausea, vomiting or abdominal pain.

## 2021-11-21 NOTE — ED ADULT NURSE NOTE - NSIMPLEMENTINTERV_GEN_ALL_ED
Implemented All Fall with Harm Risk Interventions:  [place] to call system. Call bell, personal items and telephone within reach. Instruct patient to call for assistance. Room bathroom lighting operational. Non-slip footwear when patient is off stretcher. Physically safe environment: no spills, clutter or unnecessary equipment. Stretcher in lowest position, wheels locked, appropriate side rails in place. Provide visual cue, wrist band, yellow gown, etc. Monitor gait and stability. Monitor for mental status changes and reorient to person, place, and time. Review medications for side effects contributing to fall risk. Reinforce activity limits and safety measures with patient and family. Provide visual clues: red socks.

## 2021-11-21 NOTE — H&P ADULT - PROBLEM SELECTOR PLAN 1
tele monitor  [facility] neuro in charge  Neuro checks Q4h  Permissive /120 for 24h  Will obtain MRI Head w/o con (MRI verbal consent obtain from [place], granddaughter).  PT/OT  FLP, HgA1c  start on Lipitor 40mg  Discussed with Neuro fellow: will continue Cilostazol BID and No ASA for now.  Speech and Swallow  TTE w/ bubble study

## 2021-11-21 NOTE — ED ADULT NURSE NOTE - OBJECTIVE STATEMENT
pt received to TR B, awake and alert, responsive to verbal stimuli.  pt aphasic.  as per family, pt was eating breakfast and she dropped her fork and started choking.  presents to ED with right arm drift and right facial droop.  SL placed, labs sent, neuro at bedside, pt on CT table.  report given to Kurt FORTUNE RN.

## 2021-11-22 LAB
A1C WITH ESTIMATED AVERAGE GLUCOSE RESULT: 7 % — HIGH (ref 4–5.6)
ANION GAP SERPL CALC-SCNC: 15 MMOL/L — HIGH (ref 7–14)
BUN SERPL-MCNC: 36 MG/DL — HIGH (ref 7–23)
CALCIUM SERPL-MCNC: 9.7 MG/DL — SIGNIFICANT CHANGE UP (ref 8.4–10.5)
CHLORIDE SERPL-SCNC: 107 MMOL/L — SIGNIFICANT CHANGE UP (ref 98–107)
CHOLEST SERPL-MCNC: 225 MG/DL — HIGH
CO2 SERPL-SCNC: 19 MMOL/L — LOW (ref 22–31)
COVID-19 NUCLEOCAPSID GAM AB INTERP: NEGATIVE — SIGNIFICANT CHANGE UP
COVID-19 NUCLEOCAPSID TOTAL GAM ANTIBODY RESULT: 0.11 INDEX — SIGNIFICANT CHANGE UP
COVID-19 SPIKE DOMAIN AB INTERP: POSITIVE
COVID-19 SPIKE DOMAIN ANTIBODY RESULT: 36.2 U/ML — HIGH
CREAT SERPL-MCNC: 1.58 MG/DL — HIGH (ref 0.5–1.3)
ESTIMATED AVERAGE GLUCOSE: 154 — SIGNIFICANT CHANGE UP
GLUCOSE BLDC GLUCOMTR-MCNC: 138 MG/DL — HIGH (ref 70–99)
GLUCOSE BLDC GLUCOMTR-MCNC: 157 MG/DL — HIGH (ref 70–99)
GLUCOSE BLDC GLUCOMTR-MCNC: 158 MG/DL — HIGH (ref 70–99)
GLUCOSE BLDC GLUCOMTR-MCNC: 175 MG/DL — HIGH (ref 70–99)
GLUCOSE SERPL-MCNC: 172 MG/DL — HIGH (ref 70–99)
HCT VFR BLD CALC: 34.9 % — SIGNIFICANT CHANGE UP (ref 34.5–45)
HDLC SERPL-MCNC: 61 MG/DL — SIGNIFICANT CHANGE UP
HGB BLD-MCNC: 11.7 G/DL — SIGNIFICANT CHANGE UP (ref 11.5–15.5)
LIPID PNL WITH DIRECT LDL SERPL: 137 MG/DL — HIGH
MCHC RBC-ENTMCNC: 31.4 PG — SIGNIFICANT CHANGE UP (ref 27–34)
MCHC RBC-ENTMCNC: 33.5 GM/DL — SIGNIFICANT CHANGE UP (ref 32–36)
MCV RBC AUTO: 93.6 FL — SIGNIFICANT CHANGE UP (ref 80–100)
NON HDL CHOLESTEROL: 164 MG/DL — HIGH
NRBC # BLD: 0 /100 WBCS — SIGNIFICANT CHANGE UP
NRBC # FLD: 0 K/UL — SIGNIFICANT CHANGE UP
PLATELET # BLD AUTO: 230 K/UL — SIGNIFICANT CHANGE UP (ref 150–400)
POTASSIUM SERPL-MCNC: 4.6 MMOL/L — SIGNIFICANT CHANGE UP (ref 3.5–5.3)
POTASSIUM SERPL-SCNC: 4.6 MMOL/L — SIGNIFICANT CHANGE UP (ref 3.5–5.3)
RBC # BLD: 3.73 M/UL — LOW (ref 3.8–5.2)
RBC # FLD: 13.4 % — SIGNIFICANT CHANGE UP (ref 10.3–14.5)
SARS-COV-2 IGG+IGM SERPL QL IA: 0.11 INDEX — SIGNIFICANT CHANGE UP
SARS-COV-2 IGG+IGM SERPL QL IA: 36.2 U/ML — HIGH
SARS-COV-2 IGG+IGM SERPL QL IA: NEGATIVE — SIGNIFICANT CHANGE UP
SARS-COV-2 IGG+IGM SERPL QL IA: POSITIVE
SODIUM SERPL-SCNC: 141 MMOL/L — SIGNIFICANT CHANGE UP (ref 135–145)
TRIGL SERPL-MCNC: 134 MG/DL — SIGNIFICANT CHANGE UP
TSH SERPL-MCNC: 0.97 UIU/ML — SIGNIFICANT CHANGE UP (ref 0.27–4.2)
WBC # BLD: 6.78 K/UL — SIGNIFICANT CHANGE UP (ref 3.8–10.5)
WBC # FLD AUTO: 6.78 K/UL — SIGNIFICANT CHANGE UP (ref 3.8–10.5)

## 2021-11-22 PROCEDURE — 99233 SBSQ HOSP IP/OBS HIGH 50: CPT

## 2021-11-22 RX ORDER — OLANZAPINE 15 MG/1
2.5 TABLET, FILM COATED ORAL ONCE
Refills: 0 | Status: DISCONTINUED | OUTPATIENT
Start: 2021-11-22 | End: 2021-11-22

## 2021-11-22 RX ORDER — HALOPERIDOL DECANOATE 100 MG/ML
1 INJECTION INTRAMUSCULAR ONCE
Refills: 0 | Status: COMPLETED | OUTPATIENT
Start: 2021-11-22 | End: 2021-11-22

## 2021-11-22 RX ORDER — HALOPERIDOL DECANOATE 100 MG/ML
2 INJECTION INTRAMUSCULAR ONCE
Refills: 0 | Status: COMPLETED | OUTPATIENT
Start: 2021-11-22 | End: 2021-11-22

## 2021-11-22 RX ORDER — OLANZAPINE 15 MG/1
2.5 TABLET, FILM COATED ORAL ONCE
Refills: 0 | Status: COMPLETED | OUTPATIENT
Start: 2021-11-22 | End: 2021-11-22

## 2021-11-22 RX ADMIN — CILOSTAZOL 50 MILLIGRAM(S): 100 TABLET ORAL at 05:33

## 2021-11-22 RX ADMIN — HEPARIN SODIUM 5000 UNIT(S): 5000 INJECTION INTRAVENOUS; SUBCUTANEOUS at 05:32

## 2021-11-22 RX ADMIN — Medication 2000 UNIT(S): at 19:18

## 2021-11-22 RX ADMIN — ATORVASTATIN CALCIUM 40 MILLIGRAM(S): 80 TABLET, FILM COATED ORAL at 22:22

## 2021-11-22 RX ADMIN — HALOPERIDOL DECANOATE 1 MILLIGRAM(S): 100 INJECTION INTRAMUSCULAR at 02:35

## 2021-11-22 RX ADMIN — CILOSTAZOL 50 MILLIGRAM(S): 100 TABLET ORAL at 19:19

## 2021-11-22 RX ADMIN — HALOPERIDOL DECANOATE 2 MILLIGRAM(S): 100 INJECTION INTRAMUSCULAR at 03:05

## 2021-11-22 RX ADMIN — PREGABALIN 1000 MICROGRAM(S): 225 CAPSULE ORAL at 19:18

## 2021-11-22 RX ADMIN — HEPARIN SODIUM 5000 UNIT(S): 5000 INJECTION INTRAVENOUS; SUBCUTANEOUS at 22:22

## 2021-11-22 RX ADMIN — Medication 1: at 12:52

## 2021-11-22 RX ADMIN — Medication 1: at 09:22

## 2021-11-22 RX ADMIN — HEPARIN SODIUM 5000 UNIT(S): 5000 INJECTION INTRAVENOUS; SUBCUTANEOUS at 16:15

## 2021-11-22 RX ADMIN — Medication 112 MICROGRAM(S): at 05:33

## 2021-11-22 RX ADMIN — OLANZAPINE 2.5 MILLIGRAM(S): 15 TABLET, FILM COATED ORAL at 04:33

## 2021-11-22 NOTE — PROGRESS NOTE ADULT - PROBLEM SELECTOR PLAN 2
Monitor electrolytes  Trend BUN/Cr  s/p IV NS x1L   -will recheck BMP at 1830 Elevated Cr, stable, no prior labs available for review   - Monitor electrolytes, strict I/O's   [ ] OP Cr

## 2021-11-22 NOTE — PROGRESS NOTE ADULT - PROBLEM SELECTOR PLAN 5
Monitor BP daily  DASH diet  Permissive /120 for 24h HTN overnight, now out of window of permissive HTN   - Normotensive this AM off BP meds   - Restart BP meds as needed

## 2021-11-22 NOTE — SWALLOW BEDSIDE ASSESSMENT ADULT - SLP GENERAL OBSERVATIONS
Patient received in an alert and awake state, repositioned by PCA. Oral mechanism exam could not be completed due to poor participation

## 2021-11-22 NOTE — PROGRESS NOTE ADULT - SUBJECTIVE AND OBJECTIVE BOX
Merlin Mathew, MD   Hospitalist  Pager #[phone]    PROGRESS NOTE:     Patient is a 86y old  Female who presents with a chief complaint of     SUBJECTIVE / OVERNIGHT EVENTS: Patient was agitated overnight   Patient rouses to name and will answer some yes/no questions inconsistently. Did not sleep overnight and just fell asleep prior to evaluation.   Denies any pain, is able to move UE but not following commands     ADDITIONAL REVIEW OF SYSTEMS:    MEDICATIONS  (STANDING):  atorvastatin 40 milliGRAM(s) Oral at bedtime  cholecalciferol 2000 Unit(s) Oral daily  cilostazol 50 milliGRAM(s) Oral two times a day  cyanocobalamin 1000 MICROGram(s) Oral daily  dextrose 40% Gel 15 Gram(s) Oral once  dextrose 5%. 1000 milliLiter(s) (50 mL/Hr) IV Continuous <Continuous>  dextrose 5%. 1000 milliLiter(s) (100 mL/Hr) IV Continuous <Continuous>  dextrose 50% Injectable 25 Gram(s) IV Push once  dextrose 50% Injectable 12.5 Gram(s) IV Push once  dextrose 50% Injectable 25 Gram(s) IV Push once  glucagon  Injectable 1 milliGRAM(s) IntraMuscular once  heparin   Injectable 5000 Unit(s) SubCutaneous every 8 hours  insulin lispro (ADMELOG) corrective regimen sliding scale   SubCutaneous three times a day before meals  insulin lispro (ADMELOG) corrective regimen sliding scale   SubCutaneous at bedtime  levothyroxine 112 MICROGram(s) Oral daily    MEDICATIONS  (PRN):  OLANZapine Injectable 2.5 milliGRAM(s) IntraMuscular once PRN agitation      CAPILLARY BLOOD GLUCOSE      POCT Blood Glucose.: 158 mg/dL (2021 12:50)  POCT Blood Glucose.: 175 mg/dL (2021 08:39)  POCT Blood Glucose.: 194 mg/dL (2021 21:06)    I&O's Summary      PHYSICAL EXAM:  Vital Signs Last 24 Hrs  T(C): 37.1 (2021 10:29), Max: 37.1 (2021 10:29)  T(F): 98.7 (2021 10:29), Max: 98.7 (2021 10:29)  HR: 91 (2021 10:29) (83 - 106)  BP: 120/65 (2021 10:29) (120/65 - 185/106)  BP(mean): --  RR: 17 (2021 10:29) (16 - 18)  SpO2: 99% (2021 10:29) (97% - 100%)    CONSTITUTIONAL: NAD, well-developed elderly woman   RESPIRATORY: Normal respiratory effort; lungs are clear to auscultation bilaterally  CARDIOVASCULAR: Regular rate and rhythm, normal S1 and S2, no murmur/rub/gallop; No lower extremity edema; Peripheral pulses are 2+ bilaterally  ABDOMEN: Nontender to palpation, normoactive bowel sounds, no rebound/guarding; No hepatosplenomegaly  MUSCLOSKELETAL: no clubbing or cyanosis of digits; no joint swelling or tenderness to palpation  PSYCH: A+O to person, place, and time; affect appropriate    LABS:                        11.7   6.78  )-----------( 230      ( 2021 07:07 )             34.9     11-    141  |  107  |  36<H>  ----------------------------<  172<H>  4.6   |  19<L>  |  1.58<H>    Ca    9.7      2021 07:07    TPro  7.6  /  Alb  4.1  /  TBili  0.2  /  DBili  x   /  AST  26  /  ALT  21  /  AlkPhos  114  11-21    PT/INR - ( 2021 12:20 )   PT: 10.9 sec;   INR: 0.95 ratio         PTT - ( 2021 12:20 )  PTT:26.8 sec      Urinalysis Basic - ( 2021 16:02 )    Color: Colorless / Appearance: Clear / S.022 / pH: x  Gluc: x / Ketone: Negative  / Bili: Negative / Urobili: <2 mg/dL   Blood: x / Protein: 30 mg/dL / Nitrite: Negative   Leuk Esterase: Negative / RBC: 3 /HPF / WBC 2 /HPF   Sq Epi: x / Non Sq Epi: 1 /HPF / Bacteria: Negative          RADIOLOGY & ADDITIONAL TESTS:  Results Reviewed:   Imaging Personally Reviewed:  Electrocardiogram Personally Reviewed:    COORDINATION OF CARE:  Care Discussed with Consultants/Other Providers [Y/N]:  Prior or Outpatient Records Reviewed [Y/N]:   Merlin Mathew, MD   Hospitalist  Pager #[phone]    PROGRESS NOTE:     Patient is a 86y old  Female who presents with a chief complaint of     SUBJECTIVE / OVERNIGHT EVENTS: Patient was agitated overnight   Patient rouses to name and will answer some yes/no questions inconsistently. Did not sleep overnight and just fell asleep prior to evaluation.   Denies any pain, is able to move UE but not following commands     ADDITIONAL REVIEW OF SYSTEMS:    MEDICATIONS  (STANDING):  atorvastatin 40 milliGRAM(s) Oral at bedtime  cholecalciferol 2000 Unit(s) Oral daily  cilostazol 50 milliGRAM(s) Oral two times a day  cyanocobalamin 1000 MICROGram(s) Oral daily  dextrose 40% Gel 15 Gram(s) Oral once  dextrose 5%. 1000 milliLiter(s) (50 mL/Hr) IV Continuous <Continuous>  dextrose 5%. 1000 milliLiter(s) (100 mL/Hr) IV Continuous <Continuous>  dextrose 50% Injectable 25 Gram(s) IV Push once  dextrose 50% Injectable 12.5 Gram(s) IV Push once  dextrose 50% Injectable 25 Gram(s) IV Push once  glucagon  Injectable 1 milliGRAM(s) IntraMuscular once  heparin   Injectable 5000 Unit(s) SubCutaneous every 8 hours  insulin lispro (ADMELOG) corrective regimen sliding scale   SubCutaneous three times a day before meals  insulin lispro (ADMELOG) corrective regimen sliding scale   SubCutaneous at bedtime  levothyroxine 112 MICROGram(s) Oral daily    MEDICATIONS  (PRN):  OLANZapine Injectable 2.5 milliGRAM(s) IntraMuscular once PRN agitation      CAPILLARY BLOOD GLUCOSE      POCT Blood Glucose.: 158 mg/dL (2021 12:50)  POCT Blood Glucose.: 175 mg/dL (2021 08:39)  POCT Blood Glucose.: 194 mg/dL (2021 21:06)    I&O's Summary      PHYSICAL EXAM:  Vital Signs Last 24 Hrs  T(C): 37.1 (2021 10:29), Max: 37.1 (2021 10:29)  T(F): 98.7 (2021 10:29), Max: 98.7 (2021 10:29)  HR: 91 (2021 10:29) (83 - 106)  BP: 120/65 (2021 10:29) (120/65 - 185/106)  BP(mean): --  RR: 17 (2021 10:29) (16 - 18)  SpO2: 99% (2021 10:29) (97% - 100%)    CONSTITUTIONAL: NAD, well-developed elderly woman   RESPIRATORY: Normal respiratory effort; lungs are clear to auscultation bilaterally  CARDIOVASCULAR: Regular rate and rhythm, normal S1 and S2, no murmur/rub/gallop; No lower extremity edema; Peripheral pulses are 2+ bilaterally  ABDOMEN: Nontender to palpation, normoactive bowel sounds, no rebound/guarding; No hepatosplenomegaly  MUSCLOSKELETAL: no clubbing or cyanosis of digits; no joint swelling or tenderness to palpation  PSYCH: A+O to person, place, and time; affect appropriate    LABS:                        11.7   6.78  )-----------( 230      ( 2021 07:07 )             34.9     11-    141  |  107  |  36<H>  ----------------------------<  172<H>  4.6   |  19<L>  |  1.58<H>    Ca    9.7      2021 07:07    TPro  7.6  /  Alb  4.1  /  TBili  0.2  /  DBili  x   /  AST  26  /  ALT  21  /  AlkPhos  114  11-21    PT/INR - ( 2021 12:20 )   PT: 10.9 sec;   INR: 0.95 ratio         PTT - ( 2021 12:20 )  PTT:26.8 sec      Urinalysis Basic - ( 2021 16:02 )    Color: Colorless / Appearance: Clear / S.022 / pH: x  Gluc: x / Ketone: Negative  / Bili: Negative / Urobili: <2 mg/dL   Blood: x / Protein: 30 mg/dL / Nitrite: Negative   Leuk Esterase: Negative / RBC: 3 /HPF / WBC 2 /HPF   Sq Epi: x / Non Sq Epi: 1 /HPF / Bacteria: Negative          RADIOLOGY & ADDITIONAL TESTS:  Results Reviewed:   Imaging Personally Reviewed:  Electrocardiogram Personally Reviewed:    COORDINATION OF CARE:  Care Discussed with Consultants/Other Providers [Y/N]: ACP  Prior or Outpatient Records Reviewed [Y/N]:   Merlin Mathew, MD   Hospitalist  Pager #[phone]    PROGRESS NOTE:     Patient is a 86y old  Female who presents with a chief complaint of     SUBJECTIVE / OVERNIGHT EVENTS: Patient was agitated overnight   Patient rouses to name and will answer some yes/no questions inconsistently. Did not sleep overnight and just fell asleep prior to evaluation.   Denies any pain, is able to move UE but not following commands     ADDITIONAL REVIEW OF SYSTEMS:    MEDICATIONS  (STANDING):  atorvastatin 40 milliGRAM(s) Oral at bedtime  cholecalciferol 2000 Unit(s) Oral daily  cilostazol 50 milliGRAM(s) Oral two times a day  cyanocobalamin 1000 MICROGram(s) Oral daily  dextrose 40% Gel 15 Gram(s) Oral once  dextrose 5%. 1000 milliLiter(s) (50 mL/Hr) IV Continuous <Continuous>  dextrose 5%. 1000 milliLiter(s) (100 mL/Hr) IV Continuous <Continuous>  dextrose 50% Injectable 25 Gram(s) IV Push once  dextrose 50% Injectable 12.5 Gram(s) IV Push once  dextrose 50% Injectable 25 Gram(s) IV Push once  glucagon  Injectable 1 milliGRAM(s) IntraMuscular once  heparin   Injectable 5000 Unit(s) SubCutaneous every 8 hours  insulin lispro (ADMELOG) corrective regimen sliding scale   SubCutaneous three times a day before meals  insulin lispro (ADMELOG) corrective regimen sliding scale   SubCutaneous at bedtime  levothyroxine 112 MICROGram(s) Oral daily    MEDICATIONS  (PRN):  OLANZapine Injectable 2.5 milliGRAM(s) IntraMuscular once PRN agitation      CAPILLARY BLOOD GLUCOSE      POCT Blood Glucose.: 158 mg/dL (2021 12:50)  POCT Blood Glucose.: 175 mg/dL (2021 08:39)  POCT Blood Glucose.: 194 mg/dL (2021 21:06)    I&O's Summary      PHYSICAL EXAM:  Vital Signs Last 24 Hrs  T(C): 37.1 (2021 10:29), Max: 37.1 (2021 10:29)  T(F): 98.7 (2021 10:29), Max: 98.7 (2021 10:29)  HR: 91 (2021 10:29) (83 - 106)  BP: 120/65 (2021 10:29) (120/65 - 185/106)  BP(mean): --  RR: 17 (2021 10:29) (16 - 18)  SpO2: 99% (2021 10:29) (97% - 100%)    CONSTITUTIONAL: NAD, well-developed elderly woman   RESPIRATORY: Normal respiratory effort; lungs are clear to auscultation bilaterally  CARDIOVASCULAR: Regular rate and rhythm, normal S1 and S2, no murmur/rub/gallop; No lower extremity edema; Peripheral pulses are 2+ bilaterally  ABDOMEN: Nontender to palpation, normoactive bowel sounds, no rebound/guarding; No hepatosplenomegaly  MUSCLOSKELETAL: no clubbing or cyanosis of digits; no joint swelling or tenderness to palpation  PSYCH: Wakes up, able to state name, unable to answer other orientation questions     LABS:                        11.7   6.78  )-----------( 230      ( 2021 07:07 )             34.9     11-    141  |  107  |  36<H>  ----------------------------<  172<H>  4.6   |  19<L>  |  1.58<H>    Ca    9.7      2021 07:07    TPro  7.6  /  Alb  4.1  /  TBili  0.2  /  DBili  x   /  AST  26  /  ALT  21  /  AlkPhos  114  11-21    PT/INR - ( 2021 12:20 )   PT: 10.9 sec;   INR: 0.95 ratio         PTT - ( 2021 12:20 )  PTT:26.8 sec      Urinalysis Basic - ( 2021 16:02 )    Color: Colorless / Appearance: Clear / S.022 / pH: x  Gluc: x / Ketone: Negative  / Bili: Negative / Urobili: <2 mg/dL   Blood: x / Protein: 30 mg/dL / Nitrite: Negative   Leuk Esterase: Negative / RBC: 3 /HPF / WBC 2 /HPF   Sq Epi: x / Non Sq Epi: 1 /HPF / Bacteria: Negative          RADIOLOGY & ADDITIONAL TESTS:  Results Reviewed:   Imaging Personally Reviewed:  Electrocardiogram Personally Reviewed:    COORDINATION OF CARE:  Care Discussed with Consultants/Other Providers [Y/N]: ACP  Prior or Outpatient Records Reviewed [Y/N]:

## 2021-11-22 NOTE — SWALLOW BEDSIDE ASSESSMENT ADULT - COMMENTS
As per Medical H&P " 85yo F RH PMHx of DM2, HTN, Advanced Dementia, PAD (on pletal,) Hypothyroidism, and Hx of RLE DVT (in 2011 off AC) p/w fall this morning at around 10:30am. Pt's baseline: Alert and Confused where at times she doesn't recognizes her daughter, ambulates with either a cane or walker inside the house only. Pt was in normal state of health this morning where she took her shower independently and brushed her teeth, and had breakfast. At around 10:30am pt fell, unwitnessed by family, but family was in the same room. Granddaughter came to the pt to help and pt said "oh I fell on the floor." Family stood her up and was sat on the chair. Daughter gave a cup of tea with crackers. At 11am while pt was eating and drinking pt dropped the spoon from R hand and developed R facial droop. Pt also became unresponsive, starring in one direction. Then, pt leaned back and her eyes rolled back to the head for about 10 seconds. EMS arrived and pt's LOC returned back slowly. Unknown incontinence due to pt baseline incontinent, wearing diaper. Granddaughter also reported after pt leaned back there was questionable "choking' sound for few seconds. No reported weakness, vision changes, chest pain, sob, palpitations, diaphoresis, nausea, vomiting or abdominal pain."     CXR 11/21/21 Left lower lung field retrocardiac opacity.  CTH 11/21/21 "No CT evidence of acute intracranial hemorrhage, or acute transcortical infarct. 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 Received upright in chair, unable to follow 1-step directions. Poor participation noted throughout exam despite maximum encouragement from clinician as well as PCA.

## 2021-11-22 NOTE — OCCUPATIONAL THERAPY INITIAL EVALUATION ADULT - ADDITIONAL COMMENTS
Pt unable to provide history, please see medical chart for updated prior level of function/social history

## 2021-11-22 NOTE — SWALLOW BEDSIDE ASSESSMENT ADULT - SWALLOW EVAL: DIAGNOSIS
PO trials of puree as well as thin liquids was attempted. Patient refused trials of thin liquids stating "no" despite maximum encouragement. Patient initially accepted 2 trials of puree, however immediately expectorated bolus from oral cavity and said "no" . Given patient's refusal, the oropharyngeal stage could not be adequately assessed.

## 2021-11-22 NOTE — PROGRESS NOTE ADULT - PROBLEM SELECTOR PLAN 1
tele monitor  [place] neuro in charge  Neuro checks Q4h  Permissive /120 for 24h  Will obtain MRI Head w/o con (MRI verbal consent obtain from [place]ene, granddaughter).  PT/OT  FLP, HgA1c  start on Lipitor 40mg  Discussed with Neuro fellow: will continue Cilostazol BID and No ASA for now.  Speech and Swallow  TTE w/ bubble study P/w fall, unresponsive episode, R facial droop and RUE weakness c/f stroke.   - Monitor on tele: no events overnight  - Now out of window for permissive HTN  - Cont Cilostazol, Lipitor, no ASA for now per Neuro   [ ] TTE  [ ] SLP  [ ] PT/OT c/s   [ ] MRI head   - Neurology following

## 2021-11-22 NOTE — OCCUPATIONAL THERAPY INITIAL EVALUATION ADULT - GENERAL OBSERVATIONS, REHAB EVAL
Patient received semisupine in bed in NAD and agreeable to participate in OT evaluation. +tele. Patient left in semisupine position with HOB elevated above 30 degrees, in NAD. All lines intact. 1:1 present.

## 2021-11-22 NOTE — PROGRESS NOTE ADULT - PROBLEM SELECTOR PLAN 4
Daily glucose monitoring  insulin sliding scale  DASH 1800 ADA diet  serum HgA1C - Monitor FS on CDI

## 2021-11-22 NOTE — SWALLOW BEDSIDE ASSESSMENT ADULT - SWALLOW EVAL: RECOMMENDED DIET
Defer to MD for nutritional goals of care as patient uncooperative throughout and oropharyngeal swallow could not be adequately assessed.

## 2021-11-22 NOTE — OCCUPATIONAL THERAPY INITIAL EVALUATION ADULT - PERTINENT HX OF CURRENT PROBLEM, REHAB EVAL
87yo F RH PMHx of DM2, HTN, Advanced Dementia, PAD (on pletal,) Hypothyroidism, and Hx of RLE DVT (in 2011 off AC) p/w fall this morning at around 10:30am followed by unresponsiveness, slurred speech and R facial droop, admitted to tele for Stroke evaluation

## 2021-11-22 NOTE — PROGRESS NOTE ADULT - ASSESSMENT
87yo F RH PMHx of DM2, HTN, Advanced Dementia, PAD (on pletal,) Hypothyroidism, and Hx of RLE DVT (in 2011 off AC) p/w fall this morning at around 10:30am followed by unresponsiveness, slurred speech and R facial droop, admitted to tele for Stroke.   87yo F RH PMHx of DM2, HTN, Advanced Dementia, PAD (on pletal,) Hypothyroidism, and Hx of RLE DVT (in 2011 off AC) p/w fall this morning at around 10:30am followed by unresponsiveness, slurred speech and R facial droop, admitted to tele for Stroke evaluation

## 2021-11-23 LAB
ANION GAP SERPL CALC-SCNC: 11 MMOL/L — SIGNIFICANT CHANGE UP (ref 7–14)
APPEARANCE UR: CLEAR — SIGNIFICANT CHANGE UP
BACTERIA # UR AUTO: ABNORMAL
BILIRUB UR-MCNC: NEGATIVE — SIGNIFICANT CHANGE UP
BUN SERPL-MCNC: 39 MG/DL — HIGH (ref 7–23)
CALCIUM SERPL-MCNC: 9.7 MG/DL — SIGNIFICANT CHANGE UP (ref 8.4–10.5)
CHLORIDE SERPL-SCNC: 109 MMOL/L — HIGH (ref 98–107)
CO2 SERPL-SCNC: 22 MMOL/L — SIGNIFICANT CHANGE UP (ref 22–31)
COLOR SPEC: SIGNIFICANT CHANGE UP
COMMENT - URINE: SIGNIFICANT CHANGE UP
CREAT ?TM UR-MCNC: 83 MG/DL — SIGNIFICANT CHANGE UP
CREAT SERPL-MCNC: 2.06 MG/DL — HIGH (ref 0.5–1.3)
CULTURE RESULTS: SIGNIFICANT CHANGE UP
DIFF PNL FLD: ABNORMAL
EPI CELLS # UR: 1 /HPF — SIGNIFICANT CHANGE UP (ref 0–5)
GLUCOSE BLDC GLUCOMTR-MCNC: 109 MG/DL — HIGH (ref 70–99)
GLUCOSE BLDC GLUCOMTR-MCNC: 128 MG/DL — HIGH (ref 70–99)
GLUCOSE BLDC GLUCOMTR-MCNC: 148 MG/DL — HIGH (ref 70–99)
GLUCOSE BLDC GLUCOMTR-MCNC: 163 MG/DL — HIGH (ref 70–99)
GLUCOSE BLDC GLUCOMTR-MCNC: 169 MG/DL — HIGH (ref 70–99)
GLUCOSE SERPL-MCNC: 149 MG/DL — HIGH (ref 70–99)
GLUCOSE UR QL: NEGATIVE — SIGNIFICANT CHANGE UP
HCT VFR BLD CALC: 34.6 % — SIGNIFICANT CHANGE UP (ref 34.5–45)
HGB BLD-MCNC: 11.2 G/DL — LOW (ref 11.5–15.5)
KETONES UR-MCNC: NEGATIVE — SIGNIFICANT CHANGE UP
LEUKOCYTE ESTERASE UR-ACNC: NEGATIVE — SIGNIFICANT CHANGE UP
MAGNESIUM SERPL-MCNC: 2.6 MG/DL — SIGNIFICANT CHANGE UP (ref 1.6–2.6)
MCHC RBC-ENTMCNC: 30.9 PG — SIGNIFICANT CHANGE UP (ref 27–34)
MCHC RBC-ENTMCNC: 32.4 GM/DL — SIGNIFICANT CHANGE UP (ref 32–36)
MCV RBC AUTO: 95.3 FL — SIGNIFICANT CHANGE UP (ref 80–100)
NITRITE UR-MCNC: NEGATIVE — SIGNIFICANT CHANGE UP
NRBC # BLD: 0 /100 WBCS — SIGNIFICANT CHANGE UP
NRBC # FLD: 0 K/UL — SIGNIFICANT CHANGE UP
PH UR: 6.5 — SIGNIFICANT CHANGE UP (ref 5–8)
PHOSPHATE SERPL-MCNC: 4.2 MG/DL — SIGNIFICANT CHANGE UP (ref 2.5–4.5)
PLATELET # BLD AUTO: 223 K/UL — SIGNIFICANT CHANGE UP (ref 150–400)
POTASSIUM SERPL-MCNC: 4.7 MMOL/L — SIGNIFICANT CHANGE UP (ref 3.5–5.3)
POTASSIUM SERPL-SCNC: 4.7 MMOL/L — SIGNIFICANT CHANGE UP (ref 3.5–5.3)
PROT UR-MCNC: ABNORMAL
RBC # BLD: 3.63 M/UL — LOW (ref 3.8–5.2)
RBC # FLD: 13.5 % — SIGNIFICANT CHANGE UP (ref 10.3–14.5)
RBC CASTS # UR COMP ASSIST: 2 /HPF — SIGNIFICANT CHANGE UP (ref 0–4)
SODIUM SERPL-SCNC: 142 MMOL/L — SIGNIFICANT CHANGE UP (ref 135–145)
SODIUM UR-SCNC: 125 MMOL/L — SIGNIFICANT CHANGE UP
SP GR SPEC: 1.02 — SIGNIFICANT CHANGE UP (ref 1–1.05)
SPECIMEN SOURCE: SIGNIFICANT CHANGE UP
UROBILINOGEN FLD QL: SIGNIFICANT CHANGE UP
WBC # BLD: 5.7 K/UL — SIGNIFICANT CHANGE UP (ref 3.8–10.5)
WBC # FLD AUTO: 5.7 K/UL — SIGNIFICANT CHANGE UP (ref 3.8–10.5)
WBC UR QL: 29 /HPF — HIGH (ref 0–5)

## 2021-11-23 PROCEDURE — 99233 SBSQ HOSP IP/OBS HIGH 50: CPT

## 2021-11-23 RX ORDER — OLANZAPINE 15 MG/1
2.5 TABLET, FILM COATED ORAL ONCE
Refills: 0 | Status: COMPLETED | OUTPATIENT
Start: 2021-11-23 | End: 2021-11-23

## 2021-11-23 RX ORDER — SODIUM CHLORIDE 9 MG/ML
1000 INJECTION, SOLUTION INTRAVENOUS
Refills: 0 | Status: DISCONTINUED | OUTPATIENT
Start: 2021-11-23 | End: 2021-11-24

## 2021-11-23 RX ADMIN — OLANZAPINE 2.5 MILLIGRAM(S): 15 TABLET, FILM COATED ORAL at 05:03

## 2021-11-23 RX ADMIN — PREGABALIN 1000 MICROGRAM(S): 225 CAPSULE ORAL at 11:44

## 2021-11-23 RX ADMIN — HEPARIN SODIUM 5000 UNIT(S): 5000 INJECTION INTRAVENOUS; SUBCUTANEOUS at 22:35

## 2021-11-23 RX ADMIN — SODIUM CHLORIDE 80 MILLILITER(S): 9 INJECTION, SOLUTION INTRAVENOUS at 09:02

## 2021-11-23 RX ADMIN — HEPARIN SODIUM 5000 UNIT(S): 5000 INJECTION INTRAVENOUS; SUBCUTANEOUS at 11:45

## 2021-11-23 RX ADMIN — Medication 2000 UNIT(S): at 17:24

## 2021-11-23 RX ADMIN — HEPARIN SODIUM 5000 UNIT(S): 5000 INJECTION INTRAVENOUS; SUBCUTANEOUS at 05:09

## 2021-11-23 RX ADMIN — OLANZAPINE 2.5 MILLIGRAM(S): 15 TABLET, FILM COATED ORAL at 22:54

## 2021-11-23 RX ADMIN — CILOSTAZOL 50 MILLIGRAM(S): 100 TABLET ORAL at 17:25

## 2021-11-23 RX ADMIN — Medication 1: at 11:48

## 2021-11-23 RX ADMIN — Medication 112 MICROGRAM(S): at 05:10

## 2021-11-23 RX ADMIN — SODIUM CHLORIDE 80 MILLILITER(S): 9 INJECTION, SOLUTION INTRAVENOUS at 22:37

## 2021-11-23 RX ADMIN — CILOSTAZOL 50 MILLIGRAM(S): 100 TABLET ORAL at 05:09

## 2021-11-23 NOTE — PROVIDER CONTACT NOTE (OTHER) - RECOMMENDATIONS
ACP Carolina Alexandra made aware. Will continue to maintain neuro q4 and notify provider of changes.
NILES Alexandra made aware

## 2021-11-23 NOTE — PROVIDER CONTACT NOTE (OTHER) - BACKGROUND
pt. admitted complaining of facial droop
86 Female with hisotry of type 2 diabetes, advanced dementia. Admitted for stroke and weakness.
86 female with history of DM2, HTN, Advanced Dementia, admitted due to status post fall followed by unresponsiveness, slurred speech and R facial droop, admitted to tele for Stroke
patient admitted for facial droop

## 2021-11-23 NOTE — PROGRESS NOTE ADULT - ATTENDING COMMENTS
Medications on hold: Metoprolol, Nifedipine Medications on hold: Metoprolol, Nifedipine  Family updated 11/23

## 2021-11-23 NOTE — PROGRESS NOTE ADULT - PROBLEM SELECTOR PLAN 5
HTN overnight, now out of window of permissive HTN   - Normotensive this AM off BP meds   - Restart BP meds as needed

## 2021-11-23 NOTE — PROVIDER CONTACT NOTE (OTHER) - ACTION/TREATMENT ORDERED:
NILES Alexandra made aware.
ACP Carolina Alexandra made aware. Will continue to maintain neuro q4 and notify provider of changes.
no further orders from provider at this time, continue to monitor patient
PA ordered 1mg haldol

## 2021-11-23 NOTE — PROVIDER CONTACT NOTE (OTHER) - ASSESSMENT
patient agitated and combative, patient AOx2, swatting at staff. pt. attempting to get out of stretcher, stating she wants to go home.
patient scored MEWS of 7, HR elevated at 106, /88, temp. 98, and respirations 18. patient denies headache, dizziness, SOB. patient AOx2 agitated stating she wants to go home.
Pt is being uncooperative with neurological exam that's is scheduled every 4 hours. Pt states "I want to sleep" and is refusing to cooperate despite multiple RNs trying to encourage the pt for neuros. Pt is very confused and restless. Reoriented and redirected as much as possible. Multiple RNs including myself have tried to get the pt to participate in the neuro assessment but pt continues to be uncooperative.
Pt's neurological exam not exactly the same as what was given in report from ED Nurse Marva. Marva RN told me during report that pt had no drift will all extremities w/ minor facial asymmetry. Pt's left pupil is irregular in shape and not round and a 3mm, left pupil is a 2mm and round. Pt's upper extremities have no drift but lower extremities have some effort against gravity. Pt in no acute distress. Vital signs stable. No signs or symptoms of chest pain, SOB, or generalized pain present.

## 2021-11-23 NOTE — PROVIDER CONTACT NOTE (OTHER) - REASON
Pt uncooperative with neurological exam
patient agitated and combative
patient scored MEWS of 7
Pt's neurological exam not exactly the same as what was given in report from ED Nurse Marva.

## 2021-11-23 NOTE — PROGRESS NOTE ADULT - PROBLEM SELECTOR PLAN 1
P/w fall, unresponsive episode, R facial droop and RUE weakness c/f stroke.   - Monitor on tele: no events overnight  - Now out of window for permissive HTN  - Cont Cilostazol, Lipitor, no ASA for now per Neuro   - PT: Rehab   [ ] TTE   [ ] vEEG  [ ] MRI head   - Neurology following

## 2021-11-23 NOTE — PROGRESS NOTE ADULT - PROBLEM SELECTOR PLAN 9
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

## 2021-11-23 NOTE — PROGRESS NOTE ADULT - PROBLEM SELECTOR PLAN 2
Baseline Cr 1.7 per PCP, 1.5 on admission and now elevated to 2 in setting of limited PO intake   - Encourage PO   - Strict I/O's   [ ] U/A, urine lytes Baseline Cr 1.7 per PCP, 1.5 on admission and now elevated to 2 in setting of limited PO intake TODD on CKD   - Encourage PO   - Strict I/O's   - Trial of IVF   [ ] U/A, urine lytes

## 2021-11-23 NOTE — CHART NOTE - NSCHARTNOTEFT_GEN_A_CORE
Need for Hospital bed   The patient has medical conditions, Dementia, Stroke, LOC, PAD, hx of DVT, Weakness, Hyperkalemia, TODD, hypothyroidism, which require positioning of the body in ways not feasible with an ordinary bed and require frequent changes in body position. Head of the bed must be elevated to 30 degrees due to dysphagia diagnosis.

## 2021-11-23 NOTE — PROGRESS NOTE ADULT - SUBJECTIVE AND OBJECTIVE BOX
Merlin Mathew, MD   Hospitalist  Pager #[phone]    PROGRESS NOTE:     Patient is a 86y old  Female who presents with a chief complaint of     SUBJECTIVE / OVERNIGHT EVENTS:   Patient repeatedly requesting to go and move, not following commands consistently. Oriented to self. Denies any pain.   at baseline- disoriented, thinks shes living in [facility], does not recognize family, not oriented to date   walks 5-10 steps   feeds herself   makes her needs known   gets confused, agitated   when routine gets interrupted, gets agitated   Granddaughter visited patient yesterday and felt she was at her baseline     ADDITIONAL REVIEW OF SYSTEMS:    MEDICATIONS  (STANDING):  atorvastatin 40 milliGRAM(s) Oral at bedtime  cholecalciferol 2000 Unit(s) Oral daily  cilostazol 50 milliGRAM(s) Oral two times a day  cyanocobalamin 1000 MICROGram(s) Oral daily  dextrose 40% Gel 15 Gram(s) Oral once  dextrose 5%. 1000 milliLiter(s) (50 mL/Hr) IV Continuous <Continuous>  dextrose 5%. 1000 milliLiter(s) (100 mL/Hr) IV Continuous <Continuous>  dextrose 50% Injectable 25 Gram(s) IV Push once  dextrose 50% Injectable 12.5 Gram(s) IV Push once  dextrose 50% Injectable 25 Gram(s) IV Push once  glucagon  Injectable 1 milliGRAM(s) IntraMuscular once  heparin   Injectable 5000 Unit(s) SubCutaneous every 8 hours  insulin lispro (ADMELOG) corrective regimen sliding scale   SubCutaneous three times a day before meals  insulin lispro (ADMELOG) corrective regimen sliding scale   SubCutaneous at bedtime  levothyroxine 112 MICROGram(s) Oral daily    MEDICATIONS  (PRN):  OLANZapine Injectable 2.5 milliGRAM(s) IntraMuscular once PRN agitation      CAPILLARY BLOOD GLUCOSE      POCT Blood Glucose.: 158 mg/dL (2021 12:50)  POCT Blood Glucose.: 175 mg/dL (2021 08:39)  POCT Blood Glucose.: 194 mg/dL (2021 21:06)    I&O's Summary      PHYSICAL EXAM:  Vital Signs Last 24 Hrs  T(C): 37.1 (2021 10:29), Max: 37.1 (2021 10:29)  T(F): 98.7 (2021 10:29), Max: 98.7 (2021 10:29)  HR: 91 (2021 10:29) (83 - 106)  BP: 120/65 (2021 10:29) (120/65 - 185/106)  BP(mean): --  RR: 17 (2021 10:29) (16 - 18)  SpO2: 99% (2021 10:29) (97% - 100%)    CONSTITUTIONAL: NAD, well-developed elderly woman   RESPIRATORY: Normal respiratory effort; lungs are clear to auscultation bilaterally  CARDIOVASCULAR: Regular rate and rhythm, normal S1 and S2, no murmur/rub/gallop; No lower extremity edema; Peripheral pulses are 2+ bilaterally  ABDOMEN: Nontender to palpation, normoactive bowel sounds, no rebound/guarding; No hepatosplenomegaly  MUSCULOSKELETAL no clubbing or cyanosis of digits; no joint swelling or tenderness to palpation  PSYCH: Agitated, able to answer yes/no questions, not tracking. Moving all extremities spontaneously     LABS:                        11.2   5.70  )-----------( 223      ( 2021 07:05 )             34.6     2021 07:05    142    |  109    |  39     ----------------------------<  149    4.7     |  22     |  2.06     Ca    9.7        2021 07:05  Phos  4.2       2021 07:05  Mg     2.60      2021 07:05        Urinalysis Basic - ( 2021 16:02 )    Color: Colorless / Appearance: Clear / S.022 / pH: x  Gluc: x / Ketone: Negative  / Bili: Negative / Urobili: <2 mg/dL   Blood: x / Protein: 30 mg/dL / Nitrite: Negative   Leuk Esterase: Negative / RBC: 3 /HPF / WBC 2 /HPF   Sq Epi: x / Non Sq Epi: 1 /HPF / Bacteria: Negative          RADIOLOGY & ADDITIONAL TESTS:  Results Reviewed:   Imaging Personally Reviewed:  Electrocardiogram Personally Reviewed:    COORDINATION OF CARE:  Care Discussed with Consultants/Other Providers [Y/N]: ACP  Prior or Outpatient Records Reviewed [Y/N]:

## 2021-11-23 NOTE — PROVIDER CONTACT NOTE (OTHER) - SITUATION
Pt is being uncooperative with neurological exam that's is scheduled every 4 hours. Pt states "I want to sleep" and is refusing to cooperate despite multiple RNs trying to encourage the pt for neuros.
patient agitated and combative
patient scored MEWS of 7
Pt's neurological exam not exactly the same as what was given in report from ED Nurse Marva.

## 2021-11-24 ENCOUNTER — TRANSCRIPTION ENCOUNTER (OUTPATIENT)
Age: 86
End: 2021-11-24

## 2021-11-24 LAB
ANION GAP SERPL CALC-SCNC: 13 MMOL/L — SIGNIFICANT CHANGE UP (ref 7–14)
BUN SERPL-MCNC: 38 MG/DL — HIGH (ref 7–23)
CALCIUM SERPL-MCNC: 9.9 MG/DL — SIGNIFICANT CHANGE UP (ref 8.4–10.5)
CHLORIDE SERPL-SCNC: 107 MMOL/L — SIGNIFICANT CHANGE UP (ref 98–107)
CO2 SERPL-SCNC: 21 MMOL/L — LOW (ref 22–31)
CREAT SERPL-MCNC: 2 MG/DL — HIGH (ref 0.5–1.3)
GLUCOSE BLDC GLUCOMTR-MCNC: 131 MG/DL — HIGH (ref 70–99)
GLUCOSE BLDC GLUCOMTR-MCNC: 136 MG/DL — HIGH (ref 70–99)
GLUCOSE BLDC GLUCOMTR-MCNC: 165 MG/DL — HIGH (ref 70–99)
GLUCOSE BLDC GLUCOMTR-MCNC: 174 MG/DL — HIGH (ref 70–99)
GLUCOSE SERPL-MCNC: 160 MG/DL — HIGH (ref 70–99)
HCT VFR BLD CALC: 34.2 % — LOW (ref 34.5–45)
HGB BLD-MCNC: 11.3 G/DL — LOW (ref 11.5–15.5)
MAGNESIUM SERPL-MCNC: 2.5 MG/DL — SIGNIFICANT CHANGE UP (ref 1.6–2.6)
MCHC RBC-ENTMCNC: 32.2 PG — SIGNIFICANT CHANGE UP (ref 27–34)
MCHC RBC-ENTMCNC: 33 GM/DL — SIGNIFICANT CHANGE UP (ref 32–36)
MCV RBC AUTO: 97.4 FL — SIGNIFICANT CHANGE UP (ref 80–100)
NRBC # BLD: 0 /100 WBCS — SIGNIFICANT CHANGE UP
NRBC # FLD: 0 K/UL — SIGNIFICANT CHANGE UP
PHOSPHATE SERPL-MCNC: 4.5 MG/DL — SIGNIFICANT CHANGE UP (ref 2.5–4.5)
PLATELET # BLD AUTO: 215 K/UL — SIGNIFICANT CHANGE UP (ref 150–400)
POTASSIUM SERPL-MCNC: 4.6 MMOL/L — SIGNIFICANT CHANGE UP (ref 3.5–5.3)
POTASSIUM SERPL-SCNC: 4.6 MMOL/L — SIGNIFICANT CHANGE UP (ref 3.5–5.3)
RBC # BLD: 3.51 M/UL — LOW (ref 3.8–5.2)
RBC # FLD: 13.7 % — SIGNIFICANT CHANGE UP (ref 10.3–14.5)
SODIUM SERPL-SCNC: 141 MMOL/L — SIGNIFICANT CHANGE UP (ref 135–145)
WBC # BLD: 4.78 K/UL — SIGNIFICANT CHANGE UP (ref 3.8–10.5)
WBC # FLD AUTO: 4.78 K/UL — SIGNIFICANT CHANGE UP (ref 3.8–10.5)

## 2021-11-24 PROCEDURE — 70551 MRI BRAIN STEM W/O DYE: CPT | Mod: 26

## 2021-11-24 PROCEDURE — 99232 SBSQ HOSP IP/OBS MODERATE 35: CPT

## 2021-11-24 PROCEDURE — 93306 TTE W/DOPPLER COMPLETE: CPT | Mod: 26

## 2021-11-24 PROCEDURE — 95718 EEG PHYS/QHP 2-12 HR W/VEEG: CPT

## 2021-11-24 RX ORDER — METOPROLOL TARTRATE 50 MG
50 TABLET ORAL
Refills: 0 | Status: DISCONTINUED | OUTPATIENT
Start: 2021-11-24 | End: 2021-11-27

## 2021-11-24 RX ORDER — SODIUM CHLORIDE 9 MG/ML
1000 INJECTION, SOLUTION INTRAVENOUS
Refills: 0 | Status: DISCONTINUED | OUTPATIENT
Start: 2021-11-24 | End: 2021-11-25

## 2021-11-24 RX ORDER — METOPROLOL TARTRATE 50 MG
50 TABLET ORAL
Refills: 0 | Status: DISCONTINUED | OUTPATIENT
Start: 2021-11-24 | End: 2021-11-24

## 2021-11-24 RX ADMIN — HEPARIN SODIUM 5000 UNIT(S): 5000 INJECTION INTRAVENOUS; SUBCUTANEOUS at 22:11

## 2021-11-24 RX ADMIN — HEPARIN SODIUM 5000 UNIT(S): 5000 INJECTION INTRAVENOUS; SUBCUTANEOUS at 18:01

## 2021-11-24 RX ADMIN — ATORVASTATIN CALCIUM 40 MILLIGRAM(S): 80 TABLET, FILM COATED ORAL at 22:10

## 2021-11-24 RX ADMIN — CILOSTAZOL 50 MILLIGRAM(S): 100 TABLET ORAL at 05:02

## 2021-11-24 RX ADMIN — Medication 112 MICROGRAM(S): at 05:02

## 2021-11-24 RX ADMIN — HEPARIN SODIUM 5000 UNIT(S): 5000 INJECTION INTRAVENOUS; SUBCUTANEOUS at 05:02

## 2021-11-24 RX ADMIN — PREGABALIN 1000 MICROGRAM(S): 225 CAPSULE ORAL at 18:02

## 2021-11-24 RX ADMIN — Medication 1: at 08:04

## 2021-11-24 RX ADMIN — SODIUM CHLORIDE 80 MILLILITER(S): 9 INJECTION, SOLUTION INTRAVENOUS at 22:10

## 2021-11-24 RX ADMIN — Medication 2000 UNIT(S): at 18:01

## 2021-11-24 RX ADMIN — SODIUM CHLORIDE 80 MILLILITER(S): 9 INJECTION, SOLUTION INTRAVENOUS at 06:00

## 2021-11-24 RX ADMIN — Medication 50 MILLIGRAM(S): at 18:01

## 2021-11-24 RX ADMIN — CILOSTAZOL 50 MILLIGRAM(S): 100 TABLET ORAL at 18:01

## 2021-11-24 NOTE — PROGRESS NOTE ADULT - PROBLEM SELECTOR PLAN 1
P/w fall, unresponsive episode, R facial droop and RUE weakness c/f stroke.   - Monitor on tele: no events overnight  - Now out of window for permissive HTN  - Cont Cilostazol, Lipitor, no ASA for now per Neuro   - PT: Rehab   [ ] TTE   [ ] vEEG  [ ] MRI head   - Neurology following P/w fall, unresponsive episode, R facial droop and RUE weakness c/f stroke.   - Monitor on tele: no events overnight  - Now out of window for permissive HTN  - Cont Cilostazol, Lipitor, no ASA for now per Neuro   - PT: Rehab   [ ] TTE   [ ] vEEG- leads will be placed today   [ ] MRI head- awaiting read  - Neurology following P/w fall, unresponsive episode, R facial droop and RUE weakness c/f stroke.   - Monitor on tele: no events overnight  - Now out of window for permissive HTN  - Cont Cilostazol, Lipitor, no ASA for now per Neuro   - PT: Rehab, family refusing, would like to take patient home   [ ] TTE   [ ] vEEG- leads will be placed today   [ ] MRI head- awaiting read  - Neurology following

## 2021-11-24 NOTE — DISCHARGE NOTE NURSING/CASE MANAGEMENT/SOCIAL WORK - PATIENT PORTAL LINK FT
You can access the FollowMyHealth Patient Portal offered by [facility] by registering at the following website: [email or web address]. By joining [facility]’s FollowMyHealth portal, you will also be able to view your health information using other applications (apps) compatible with our system.

## 2021-11-24 NOTE — PROGRESS NOTE ADULT - SUBJECTIVE AND OBJECTIVE BOX
Merlin Mathew, MD   Hospitalist  Pager #[phone]    PROGRESS NOTE:     Patient is a 86y old  Female who presents with a chief complaint of     SUBJECTIVE / OVERNIGHT EVENTS:   Patient sleeping on approach. When awoken, patient repeatedly asks to be left alone so she can sleep.   Has no current complaints.   ADDITIONAL REVIEW OF SYSTEMS:    MEDICATIONS  (STANDING):  atorvastatin 40 milliGRAM(s) Oral at bedtime  cholecalciferol 2000 Unit(s) Oral daily  cilostazol 50 milliGRAM(s) Oral two times a day  cyanocobalamin 1000 MICROGram(s) Oral daily  dextrose 40% Gel 15 Gram(s) Oral once  dextrose 5%. 1000 milliLiter(s) (50 mL/Hr) IV Continuous <Continuous>  dextrose 5%. 1000 milliLiter(s) (100 mL/Hr) IV Continuous <Continuous>  dextrose 50% Injectable 25 Gram(s) IV Push once  dextrose 50% Injectable 12.5 Gram(s) IV Push once  dextrose 50% Injectable 25 Gram(s) IV Push once  glucagon  Injectable 1 milliGRAM(s) IntraMuscular once  heparin   Injectable 5000 Unit(s) SubCutaneous every 8 hours  insulin lispro (ADMELOG) corrective regimen sliding scale   SubCutaneous three times a day before meals  insulin lispro (ADMELOG) corrective regimen sliding scale   SubCutaneous at bedtime  levothyroxine 112 MICROGram(s) Oral daily    MEDICATIONS  (PRN):  OLANZapine Injectable 2.5 milliGRAM(s) IntraMuscular once PRN agitation      CAPILLARY BLOOD GLUCOSE      POCT Blood Glucose.: 158 mg/dL (2021 12:50)  POCT Blood Glucose.: 175 mg/dL (2021 08:39)  POCT Blood Glucose.: 194 mg/dL (2021 21:06)    I&O's Summary      PHYSICAL EXAM:  Vital Signs Last 24 Hrs  T(C): 37.1 (2021 10:29), Max: 37.1 (2021 10:29)  T(F): 98.7 (2021 10:29), Max: 98.7 (2021 10:29)  HR: 91 (2021 10:29) (83 - 106)  BP: 120/65 (2021 10:29) (120/65 - 185/106)  BP(mean): --  RR: 17 (2021 10:29) (16 - 18)  SpO2: 99% (2021 10:29) (97% - 100%)    CONSTITUTIONAL: NAD, well-developed elderly woman   RESPIRATORY: Normal respiratory effort; lungs are clear to auscultation bilaterally  CARDIOVASCULAR: Regular rate and rhythm, normal S1 and S2, no murmur/rub/gallop; No lower extremity edema; Peripheral pulses are 2+ bilaterally  ABDOMEN: Nontender to palpation, normoactive bowel sounds, no rebound/guarding; No hepatosplenomegaly  MUSCULOSKELETAL no clubbing or cyanosis of digits; no joint swelling or tenderness to palpation  PSYCH: Agitated, able to answer yes/no questions, not tracking. Moving all extremities spontaneously     LABS:                        11.3   4.78  )-----------( 215      ( 2021 05:51 )             34.2     2021 05:51    141    |  107    |  38     ----------------------------<  160    4.6     |  21     |  2.00     Ca    9.9        2021 05:51  Phos  4.5       2021 05:51  Mg     2.50      2021 05:51          Urinalysis Basic - ( 2021 16:02 )    Color: Colorless / Appearance: Clear / S.022 / pH: x  Gluc: x / Ketone: Negative  / Bili: Negative / Urobili: <2 mg/dL   Blood: x / Protein: 30 mg/dL / Nitrite: Negative   Leuk Esterase: Negative / RBC: 3 /HPF / WBC 2 /HPF   Sq Epi: x / Non Sq Epi: 1 /HPF / Bacteria: Negative          RADIOLOGY & ADDITIONAL TESTS:  Results Reviewed:   Imaging Personally Reviewed:  Electrocardiogram Personally Reviewed:    COORDINATION OF CARE:  Care Discussed with Consultants/Other Providers [Y/N]: ACP  Prior or Outpatient Records Reviewed [Y/N]:

## 2021-11-24 NOTE — DISCHARGE NOTE NURSING/CASE MANAGEMENT/SOCIAL WORK - NSSCCARECORD_GEN_ALL_CORE
Home Care Agency/Community Resource Community [facility] Durable Medical Equipment Agency/Community Resource

## 2021-11-24 NOTE — PROGRESS NOTE ADULT - ASSESSMENT
85yo F RH PMHx of DM2, HTN, Advanced Dementia, PAD (on pletal,) Hypothyroidism, and Hx of RLE DVT (in 2011 off AC) p/w fall this morning at around 10:30am followed by unresponsiveness, slurred speech and R facial droop, admitted to tele for Stroke evaluation

## 2021-11-24 NOTE — PROGRESS NOTE ADULT - PROBLEM SELECTOR PLAN 5
HTN overnight, now out of window of permissive HTN   - Normotensive this AM off BP meds   - Restart BP meds as needed HTN overnight, now out of window of permissive HTN   - Hypertensive today and tachycardic- maybe 2/2 rebound. Will re-start Metoprolol 50 BID.   [ ] Nifedipine on hold, re-start as necessary

## 2021-11-24 NOTE — PROGRESS NOTE ADULT - ATTENDING COMMENTS
Medications on hold: Metoprolol, Nifedipine  Family updated 11/23 Medications on hold: Nifedipine  Family updated 11/23 Medications on hold: Nifedipine  Family updated 11/23, 24

## 2021-11-24 NOTE — PROGRESS NOTE ADULT - PROBLEM SELECTOR PLAN 2
Baseline Cr 1.7 per PCP, 1.5 on admission and now elevated to 2 in setting of limited PO intake TODD on CKD   - Encourage PO   - Strict I/O's   - Trial of IVF   [ ] U/A, urine lytes Baseline Cr 1.7 per PCP, 1.5 on admission and now elevated to 2 in setting of limited PO intake TODD on CKD  Cr unchanged today   - Urine lytes c/w intrinsic kidney dx    - Encourage PO   - Strict I/O's   - Will give additional IVF today

## 2021-11-25 LAB
ANION GAP SERPL CALC-SCNC: 11 MMOL/L — SIGNIFICANT CHANGE UP (ref 7–14)
APPEARANCE UR: CLEAR — SIGNIFICANT CHANGE UP
BACTERIA # UR AUTO: ABNORMAL
BILIRUB UR-MCNC: NEGATIVE — SIGNIFICANT CHANGE UP
BUN SERPL-MCNC: 38 MG/DL — HIGH (ref 7–23)
CALCIUM SERPL-MCNC: 10 MG/DL — SIGNIFICANT CHANGE UP (ref 8.4–10.5)
CHLORIDE SERPL-SCNC: 108 MMOL/L — HIGH (ref 98–107)
CO2 SERPL-SCNC: 22 MMOL/L — SIGNIFICANT CHANGE UP (ref 22–31)
COLOR SPEC: SIGNIFICANT CHANGE UP
COMMENT - URINE: SIGNIFICANT CHANGE UP
CREAT SERPL-MCNC: 1.8 MG/DL — HIGH (ref 0.5–1.3)
DIFF PNL FLD: NEGATIVE — SIGNIFICANT CHANGE UP
EPI CELLS # UR: 3 /HPF — SIGNIFICANT CHANGE UP (ref 0–5)
GLUCOSE BLDC GLUCOMTR-MCNC: 130 MG/DL — HIGH (ref 70–99)
GLUCOSE BLDC GLUCOMTR-MCNC: 158 MG/DL — HIGH (ref 70–99)
GLUCOSE BLDC GLUCOMTR-MCNC: 186 MG/DL — HIGH (ref 70–99)
GLUCOSE BLDC GLUCOMTR-MCNC: 284 MG/DL — HIGH (ref 70–99)
GLUCOSE SERPL-MCNC: 133 MG/DL — HIGH (ref 70–99)
GLUCOSE UR QL: NEGATIVE — SIGNIFICANT CHANGE UP
HCT VFR BLD CALC: 33.2 % — LOW (ref 34.5–45)
HGB BLD-MCNC: 11 G/DL — LOW (ref 11.5–15.5)
HYALINE CASTS # UR AUTO: 0 /LPF — SIGNIFICANT CHANGE UP (ref 0–7)
KETONES UR-MCNC: NEGATIVE — SIGNIFICANT CHANGE UP
LEUKOCYTE ESTERASE UR-ACNC: ABNORMAL
MAGNESIUM SERPL-MCNC: 2.3 MG/DL — SIGNIFICANT CHANGE UP (ref 1.6–2.6)
MCHC RBC-ENTMCNC: 31.4 PG — SIGNIFICANT CHANGE UP (ref 27–34)
MCHC RBC-ENTMCNC: 33.1 GM/DL — SIGNIFICANT CHANGE UP (ref 32–36)
MCV RBC AUTO: 94.9 FL — SIGNIFICANT CHANGE UP (ref 80–100)
NITRITE UR-MCNC: NEGATIVE — SIGNIFICANT CHANGE UP
NRBC # BLD: 0 /100 WBCS — SIGNIFICANT CHANGE UP
NRBC # FLD: 0 K/UL — SIGNIFICANT CHANGE UP
PH UR: 6.5 — SIGNIFICANT CHANGE UP (ref 5–8)
PHOSPHATE SERPL-MCNC: 4.2 MG/DL — SIGNIFICANT CHANGE UP (ref 2.5–4.5)
PLATELET # BLD AUTO: 236 K/UL — SIGNIFICANT CHANGE UP (ref 150–400)
POTASSIUM SERPL-MCNC: 4.6 MMOL/L — SIGNIFICANT CHANGE UP (ref 3.5–5.3)
POTASSIUM SERPL-SCNC: 4.6 MMOL/L — SIGNIFICANT CHANGE UP (ref 3.5–5.3)
PROT UR-MCNC: ABNORMAL
RBC # BLD: 3.5 M/UL — LOW (ref 3.8–5.2)
RBC # FLD: 13.8 % — SIGNIFICANT CHANGE UP (ref 10.3–14.5)
RBC CASTS # UR COMP ASSIST: 1 /HPF — SIGNIFICANT CHANGE UP (ref 0–4)
SODIUM SERPL-SCNC: 141 MMOL/L — SIGNIFICANT CHANGE UP (ref 135–145)
SP GR SPEC: 1.02 — SIGNIFICANT CHANGE UP (ref 1–1.05)
UROBILINOGEN FLD QL: SIGNIFICANT CHANGE UP
WBC # BLD: 4.01 K/UL — SIGNIFICANT CHANGE UP (ref 3.8–10.5)
WBC # FLD AUTO: 4.01 K/UL — SIGNIFICANT CHANGE UP (ref 3.8–10.5)
WBC UR QL: 50 /HPF — HIGH (ref 0–5)

## 2021-11-25 PROCEDURE — 99232 SBSQ HOSP IP/OBS MODERATE 35: CPT

## 2021-11-25 RX ORDER — LANOLIN ALCOHOL/MO/W.PET/CERES
3 CREAM (GRAM) TOPICAL ONCE
Refills: 0 | Status: COMPLETED | OUTPATIENT
Start: 2021-11-25 | End: 2021-11-25

## 2021-11-25 RX ORDER — SODIUM CHLORIDE 9 MG/ML
1000 INJECTION, SOLUTION INTRAVENOUS
Refills: 0 | Status: DISCONTINUED | OUTPATIENT
Start: 2021-11-25 | End: 2021-11-26

## 2021-11-25 RX ORDER — SODIUM CHLORIDE 9 MG/ML
250 INJECTION INTRAMUSCULAR; INTRAVENOUS; SUBCUTANEOUS ONCE
Refills: 0 | Status: COMPLETED | OUTPATIENT
Start: 2021-11-25 | End: 2021-11-25

## 2021-11-25 RX ORDER — HYDRALAZINE HCL 50 MG
5 TABLET ORAL ONCE
Refills: 0 | Status: COMPLETED | OUTPATIENT
Start: 2021-11-25 | End: 2021-11-25

## 2021-11-25 RX ORDER — NIFEDIPINE 30 MG
90 TABLET, EXTENDED RELEASE 24 HR ORAL DAILY
Refills: 0 | Status: DISCONTINUED | OUTPATIENT
Start: 2021-11-25 | End: 2021-11-27

## 2021-11-25 RX ORDER — NYSTATIN CREAM 100000 [USP'U]/G
1 CREAM TOPICAL
Refills: 0 | Status: DISCONTINUED | OUTPATIENT
Start: 2021-11-25 | End: 2021-11-27

## 2021-11-25 RX ADMIN — SODIUM CHLORIDE 500 MILLILITER(S): 9 INJECTION INTRAMUSCULAR; INTRAVENOUS; SUBCUTANEOUS at 20:44

## 2021-11-25 RX ADMIN — HEPARIN SODIUM 5000 UNIT(S): 5000 INJECTION INTRAVENOUS; SUBCUTANEOUS at 17:18

## 2021-11-25 RX ADMIN — Medication 50 MILLIGRAM(S): at 06:18

## 2021-11-25 RX ADMIN — Medication 1: at 11:33

## 2021-11-25 RX ADMIN — Medication 5 MILLIGRAM(S): at 02:21

## 2021-11-25 RX ADMIN — Medication 112 MICROGRAM(S): at 06:18

## 2021-11-25 RX ADMIN — Medication 90 MILLIGRAM(S): at 17:17

## 2021-11-25 RX ADMIN — Medication 50 MILLIGRAM(S): at 17:18

## 2021-11-25 RX ADMIN — PREGABALIN 1000 MICROGRAM(S): 225 CAPSULE ORAL at 17:18

## 2021-11-25 RX ADMIN — HEPARIN SODIUM 5000 UNIT(S): 5000 INJECTION INTRAVENOUS; SUBCUTANEOUS at 22:47

## 2021-11-25 RX ADMIN — ATORVASTATIN CALCIUM 40 MILLIGRAM(S): 80 TABLET, FILM COATED ORAL at 22:47

## 2021-11-25 RX ADMIN — Medication 2000 UNIT(S): at 17:17

## 2021-11-25 RX ADMIN — SODIUM CHLORIDE 80 MILLILITER(S): 9 INJECTION, SOLUTION INTRAVENOUS at 19:42

## 2021-11-25 RX ADMIN — CILOSTAZOL 50 MILLIGRAM(S): 100 TABLET ORAL at 17:17

## 2021-11-25 RX ADMIN — NYSTATIN CREAM 1 APPLICATION(S): 100000 CREAM TOPICAL at 17:18

## 2021-11-25 RX ADMIN — CILOSTAZOL 50 MILLIGRAM(S): 100 TABLET ORAL at 06:18

## 2021-11-25 RX ADMIN — Medication 1: at 22:46

## 2021-11-25 RX ADMIN — HEPARIN SODIUM 5000 UNIT(S): 5000 INJECTION INTRAVENOUS; SUBCUTANEOUS at 06:19

## 2021-11-25 RX ADMIN — Medication 1: at 08:07

## 2021-11-25 NOTE — PROGRESS NOTE ADULT - PROBLEM SELECTOR PLAN 2
Baseline Cr 1.7 per PCP, 1.5 on admission and now elevated to 2 in setting of limited PO intake TODD on CKD  - Urine lytes c/w intrinsic kidney dx    - Encourage PO   - Strict I/O's   [ ] Awaiting today's BMP

## 2021-11-25 NOTE — EEG REPORT - NS EEG TEXT BOX
[facility]   COMPREHENSIVE EPILEPSY CENTER   REPORT OF CONTINUOUS VIDEO EEG     [facility]: [address], [place] [address], Ph#: [phone]  [facility]: [address], [place] [address], Ph#: [phone]  Office: [address] [facility], [place] [address], [place] [address] Ph#: [phone]    Patient Name: MAN NDIAYE  Age and : 86y ([birth year])  MRN #: [id number]  Location: [facility] 7S 7[address]    Start Date:    4:58pm  21  pt took off leads 11:09pm 21    _____________________________________________________________  TECHNICAL INFORMATION    EEG Recording Technique:  The patient underwent continuous Video-EEG monitoring, using Telemetry System hardware on the XLTek Digital System. EEG and video data were stored on a computer hard drive with important events saved in digital archive files. The material was reviewed by a physician (electroencephalographer / epileptologist) on a daily basis. Eric and seizure detection algorithms were utilized and reviewed. An EEG Technician attended to the patient, and was available throughout daytime work hours.  The epilepsy center neurologist was available in person or on call 24-hours per day.    EEG Placement and Labeling of Electrodes:  The EEG was performed utilizing 20 channel referential EEG connections (coronal over temporal over parasagittal montage) using all standard 10-20 electrode placements with EKG, with additional electrodes placed in the inferior temporal region using the modified 10-10 montage electrode placements for elective admissions, or if deemed necessary. Recording was at a sampling rate of 256 samples per second per channel. Time synchronized digital video recording was done simultaneously with EEG recording. A low light infrared camera was used for low light recording.     _____________________________________________________________  HISTORY:  Patient is a 86y old  Female who presents with a chief complaint of r/o stroke (2021 13:36)      MEDICATIONS  (STANDING):  atorvastatin 40 milliGRAM(s) Oral at bedtime  cholecalciferol 2000 Unit(s) Oral daily  cilostazol 50 milliGRAM(s) Oral two times a day  cyanocobalamin 1000 MICROGram(s) Oral daily  dextrose 40% Gel 15 Gram(s) Oral once  dextrose 5%. 1000 milliLiter(s) (50 mL/Hr) IV Continuous <Continuous>  dextrose 5%. 1000 milliLiter(s) (100 mL/Hr) IV Continuous <Continuous>  dextrose 50% Injectable 25 Gram(s) IV Push once  dextrose 50% Injectable 12.5 Gram(s) IV Push once  dextrose 50% Injectable 25 Gram(s) IV Push once  glucagon  Injectable 1 milliGRAM(s) IntraMuscular once  heparin   Injectable 5000 Unit(s) SubCutaneous every 8 hours  insulin lispro (ADMELOG) corrective regimen sliding scale   SubCutaneous three times a day before meals  insulin lispro (ADMELOG) corrective regimen sliding scale   SubCutaneous at bedtime  lactated ringers. 1000 milliLiter(s) (80 mL/Hr) IV Continuous <Continuous>  levothyroxine 112 MICROGram(s) Oral daily  metoprolol tartrate 50 milliGRAM(s) Oral two times a day    _____________________________________________________________  STUDY INTERPRETATION    Background:  The background was continuous, spontaneously variable, reactive, and predominantly consisted of alpha activities. Excess diffuse theta during waking.   During wakefulness, the posteriorly dominant rhythm consisted of symmetric, well-modulated 8Hz activity,      Sleep:   Drowsiness was characterized by fragmentation, attenuation, and slowing of the background activity.    Stage II sleep transients were not recorded.    Focal slowing: none    Non-epileptiform Paroxysmal Abnormalities:  None    Interictal Epileptiform Abnormalities:   None    Ictal Abnormalities:   No clinical events.  No electrographic seizures.    Artifacts:  Intermittent myogenic and movement artifacts were noted.    ECG:  The heart rate on single channel ECG was predominantly between 70-90 BPM.    _____________________________________________________________  EEG CLASSIFICATION/SUMMARY:    Abnormal EEG in the awake, drowsy, states due to:  Mild generalized slowing    _____________________________________________________________  CLINICAL IMPRESSION:    Short study, patient removed leads at 11:09pm    Abnormal EEG study in the awake, drowsy, states consistent with:  Mild diffuse or multifocal cerebral dysfunction.  No epileptic pattern or seizure seen.      Christopher Lomas MD PhD  Epilepsy Attending, [facility] [place]

## 2021-11-25 NOTE — PROGRESS NOTE ADULT - SUBJECTIVE AND OBJECTIVE BOX
Merlin Mathew, MD   Hospitalist  Pager #[phone]    PROGRESS NOTE:     Patient is a 86y old  Female who presents with a chief complaint of     SUBJECTIVE / OVERNIGHT EVENTS:   No overnight events- patient removed EEG leads overnight   Patient awake, answering questions and following commands   Updated granddaughters who were at bedside   No complaints     ADDITIONAL REVIEW OF SYSTEMS:    MEDICATIONS  (STANDING):  atorvastatin 40 milliGRAM(s) Oral at bedtime  cholecalciferol 2000 Unit(s) Oral daily  cilostazol 50 milliGRAM(s) Oral two times a day  cyanocobalamin 1000 MICROGram(s) Oral daily  dextrose 40% Gel 15 Gram(s) Oral once  dextrose 5%. 1000 milliLiter(s) (50 mL/Hr) IV Continuous <Continuous>  dextrose 5%. 1000 milliLiter(s) (100 mL/Hr) IV Continuous <Continuous>  dextrose 50% Injectable 25 Gram(s) IV Push once  dextrose 50% Injectable 12.5 Gram(s) IV Push once  dextrose 50% Injectable 25 Gram(s) IV Push once  glucagon  Injectable 1 milliGRAM(s) IntraMuscular once  heparin   Injectable 5000 Unit(s) SubCutaneous every 8 hours  insulin lispro (ADMELOG) corrective regimen sliding scale   SubCutaneous three times a day before meals  insulin lispro (ADMELOG) corrective regimen sliding scale   SubCutaneous at bedtime  levothyroxine 112 MICROGram(s) Oral daily    MEDICATIONS  (PRN):  OLANZapine Injectable 2.5 milliGRAM(s) IntraMuscular once PRN agitation      CAPILLARY BLOOD GLUCOSE      POCT Blood Glucose.: 158 mg/dL (2021 12:50)  POCT Blood Glucose.: 175 mg/dL (2021 08:39)  POCT Blood Glucose.: 194 mg/dL (2021 21:06)    I&O's Summary      PHYSICAL EXAM:  Vital Signs Last 24 Hrs  T(C): 37.1 (2021 10:29), Max: 37.1 (2021 10:29)  T(F): 98.7 (2021 10:29), Max: 98.7 (2021 10:29)  HR: 91 (2021 10:29) (83 - 106)  BP: 120/65 (2021 10:29) (120/65 - 185/106)  BP(mean): --  RR: 17 (2021 10:29) (16 - 18)  SpO2: 99% (2021 10:29) (97% - 100%)    CONSTITUTIONAL: NAD, well-developed elderly woman   RESPIRATORY: Normal respiratory effort; lungs are clear to auscultation bilaterally  CARDIOVASCULAR: Regular rate and rhythm, normal S1 and S2, no murmur/rub/gallop; No lower extremity edema; Peripheral pulses are 2+ bilaterally  ABDOMEN: Nontender to palpation, normoactive bowel sounds, no rebound/guarding; No hepatosplenomegaly  MUSCULOSKELETAL no clubbing or cyanosis of digits; no joint swelling or tenderness to palpation  PSYCH: Calm ,normal affect, answering questions, alert and cooperative     LABS:      Ca    9.9        2021 05:51            Urinalysis Basic - ( 2021 16:02 )    Color: Colorless / Appearance: Clear / S.022 / pH: x  Gluc: x / Ketone: Negative  / Bili: Negative / Urobili: <2 mg/dL   Blood: x / Protein: 30 mg/dL / Nitrite: Negative   Leuk Esterase: Negative / RBC: 3 /HPF / WBC 2 /HPF   Sq Epi: x / Non Sq Epi: 1 /HPF / Bacteria: Negative          RADIOLOGY & ADDITIONAL TESTS:  Results Reviewed:   Imaging Personally Reviewed:  Electrocardiogram Personally Reviewed:    COORDINATION OF CARE:  Care Discussed with Consultants/Other Providers [Y/N]: ACP  Prior or Outpatient Records Reviewed [Y/N]:

## 2021-11-25 NOTE — PROGRESS NOTE ADULT - PROBLEM SELECTOR PLAN 5
HTN overnight, now out of window of permissive HTN   - Cont Metoprolol 50 BID   - Restart Nifedipine

## 2021-11-25 NOTE — PROGRESS NOTE ADULT - PROBLEM SELECTOR PLAN 1
P/w fall, unresponsive episode, R facial droop and RUE weakness c/f stroke.   - Monitor on tele: no events overnight  - Now out of window for permissive HTN  - Cont Cilostazol, Lipitor, no ASA for now per Neuro   - PT: Rehab, family refusing, would like to take patient home   - MRI: No acute ischemic event   - TTE: Small pericardial effusion, hyperdynamic LV   - vEEG: Could only tolerate for 5 hours, no seizure activity noted  - Neurology following, awaiting call back re clearance for discharge and follow up time

## 2021-11-26 ENCOUNTER — TRANSCRIPTION ENCOUNTER (OUTPATIENT)
Age: 86
End: 2021-11-26

## 2021-11-26 LAB
ANION GAP SERPL CALC-SCNC: 12 MMOL/L — SIGNIFICANT CHANGE UP (ref 7–14)
BUN SERPL-MCNC: 38 MG/DL — HIGH (ref 7–23)
CALCIUM SERPL-MCNC: 9.4 MG/DL — SIGNIFICANT CHANGE UP (ref 8.4–10.5)
CHLORIDE SERPL-SCNC: 107 MMOL/L — SIGNIFICANT CHANGE UP (ref 98–107)
CO2 SERPL-SCNC: 21 MMOL/L — LOW (ref 22–31)
CREAT SERPL-MCNC: 2.03 MG/DL — HIGH (ref 0.5–1.3)
GLUCOSE BLDC GLUCOMTR-MCNC: 149 MG/DL — HIGH (ref 70–99)
GLUCOSE BLDC GLUCOMTR-MCNC: 159 MG/DL — HIGH (ref 70–99)
GLUCOSE BLDC GLUCOMTR-MCNC: 208 MG/DL — HIGH (ref 70–99)
GLUCOSE BLDC GLUCOMTR-MCNC: 226 MG/DL — HIGH (ref 70–99)
GLUCOSE SERPL-MCNC: 136 MG/DL — HIGH (ref 70–99)
HCT VFR BLD CALC: 29.7 % — LOW (ref 34.5–45)
HGB BLD-MCNC: 9.9 G/DL — LOW (ref 11.5–15.5)
MAGNESIUM SERPL-MCNC: 2.2 MG/DL — SIGNIFICANT CHANGE UP (ref 1.6–2.6)
MCHC RBC-ENTMCNC: 31.8 PG — SIGNIFICANT CHANGE UP (ref 27–34)
MCHC RBC-ENTMCNC: 33.3 GM/DL — SIGNIFICANT CHANGE UP (ref 32–36)
MCV RBC AUTO: 95.5 FL — SIGNIFICANT CHANGE UP (ref 80–100)
NRBC # BLD: 0 /100 WBCS — SIGNIFICANT CHANGE UP
NRBC # FLD: 0 K/UL — SIGNIFICANT CHANGE UP
PHOSPHATE SERPL-MCNC: 3.9 MG/DL — SIGNIFICANT CHANGE UP (ref 2.5–4.5)
PLATELET # BLD AUTO: 223 K/UL — SIGNIFICANT CHANGE UP (ref 150–400)
POTASSIUM SERPL-MCNC: 4.4 MMOL/L — SIGNIFICANT CHANGE UP (ref 3.5–5.3)
POTASSIUM SERPL-SCNC: 4.4 MMOL/L — SIGNIFICANT CHANGE UP (ref 3.5–5.3)
RBC # BLD: 3.11 M/UL — LOW (ref 3.8–5.2)
RBC # FLD: 13.7 % — SIGNIFICANT CHANGE UP (ref 10.3–14.5)
SODIUM SERPL-SCNC: 140 MMOL/L — SIGNIFICANT CHANGE UP (ref 135–145)
WBC # BLD: 4.14 K/UL — SIGNIFICANT CHANGE UP (ref 3.8–10.5)
WBC # FLD AUTO: 4.14 K/UL — SIGNIFICANT CHANGE UP (ref 3.8–10.5)

## 2021-11-26 PROCEDURE — 99232 SBSQ HOSP IP/OBS MODERATE 35: CPT

## 2021-11-26 RX ORDER — HALOPERIDOL DECANOATE 100 MG/ML
1 INJECTION INTRAMUSCULAR ONCE
Refills: 0 | Status: COMPLETED | OUTPATIENT
Start: 2021-11-26 | End: 2021-11-26

## 2021-11-26 RX ORDER — HALOPERIDOL DECANOATE 100 MG/ML
1 INJECTION INTRAMUSCULAR ONCE
Refills: 0 | Status: DISCONTINUED | OUTPATIENT
Start: 2021-11-26 | End: 2021-11-26

## 2021-11-26 RX ORDER — SODIUM CHLORIDE 9 MG/ML
1000 INJECTION INTRAMUSCULAR; INTRAVENOUS; SUBCUTANEOUS
Refills: 0 | Status: DISCONTINUED | OUTPATIENT
Start: 2021-11-26 | End: 2021-11-27

## 2021-11-26 RX ADMIN — NYSTATIN CREAM 1 APPLICATION(S): 100000 CREAM TOPICAL at 18:54

## 2021-11-26 RX ADMIN — HEPARIN SODIUM 5000 UNIT(S): 5000 INJECTION INTRAVENOUS; SUBCUTANEOUS at 05:12

## 2021-11-26 RX ADMIN — Medication 112 MICROGRAM(S): at 05:12

## 2021-11-26 RX ADMIN — CILOSTAZOL 50 MILLIGRAM(S): 100 TABLET ORAL at 05:10

## 2021-11-26 RX ADMIN — Medication 2000 UNIT(S): at 18:52

## 2021-11-26 RX ADMIN — Medication 50 MILLIGRAM(S): at 18:53

## 2021-11-26 RX ADMIN — HEPARIN SODIUM 5000 UNIT(S): 5000 INJECTION INTRAVENOUS; SUBCUTANEOUS at 18:53

## 2021-11-26 RX ADMIN — NYSTATIN CREAM 1 APPLICATION(S): 100000 CREAM TOPICAL at 05:13

## 2021-11-26 RX ADMIN — Medication 1: at 12:18

## 2021-11-26 RX ADMIN — CILOSTAZOL 50 MILLIGRAM(S): 100 TABLET ORAL at 18:52

## 2021-11-26 RX ADMIN — Medication 90 MILLIGRAM(S): at 05:11

## 2021-11-26 RX ADMIN — Medication 50 MILLIGRAM(S): at 05:10

## 2021-11-26 RX ADMIN — Medication 2: at 07:50

## 2021-11-26 RX ADMIN — PREGABALIN 1000 MICROGRAM(S): 225 CAPSULE ORAL at 18:53

## 2021-11-26 RX ADMIN — Medication 3 MILLIGRAM(S): at 00:09

## 2021-11-26 RX ADMIN — HALOPERIDOL DECANOATE 1 MILLIGRAM(S): 100 INJECTION INTRAMUSCULAR at 01:58

## 2021-11-26 RX ADMIN — ATORVASTATIN CALCIUM 40 MILLIGRAM(S): 80 TABLET, FILM COATED ORAL at 21:53

## 2021-11-26 NOTE — PROGRESS NOTE ADULT - PROBLEM SELECTOR PLAN 1
P/w fall, unresponsive episode, R facial droop and RUE weakness c/f stroke.   - Cont Cilostazol, Lipitor, no ASA for now per Neuro   - PT: Rehab, family refusing, would like to take patient home   - MRI: No acute ischemic event   - TTE: Small pericardial effusion, hyperdynamic LV   - vEEG: Could only tolerate for 5 hours, no seizure activity noted  - Cleared from neurology perspective, can follow up as an OP

## 2021-11-26 NOTE — DISCHARGE NOTE PROVIDER - HOSPITAL COURSE
85yo F RH PMHx of DM2, HTN, Advanced Dementia, PAD (on pletal,) Hypothyroidism, and Hx of RLE DVT (in 2011 off AC) p/w fall this morning at around 10:30am followed by unresponsiveness, slurred speech and R facial droop, admitted to [facility] for Stroke evaluation     Stroke rule out   P/w fall, unresponsive episode, R facial droop and RUE weakness c/f stroke.   Cont Cilostazol, Lipitor, no ASA for now per Neuro   CT Head w/o con: No CT evidence of acute intracranial hemorrhage, or acute transcortical infarct. 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  CT angiography neck: Atherosclerotic plaque resulting in approximately 50% stenosis of the proximal right ICA and 60% stenosis of the proximal left ICA. Bilateral vertebral arteries are without stenosis.  CT angiography brain: No large vessel occlusion. 4 mm right cavernous ICA aneurysm. Moderate stenosis of the distal right cavernous ICA  EKG: Sinus Rhythm @ 81bpm QTC 448ms  MRI Brain done: no acute findings  TTE w/ bubble study overall normal   EEG - No epileptic pattern or seizure seen  Neuro following - Cleared from neurology perspective, can follow up as an OP    TODD  Baseline Cr 1.7 per PCP, 1.5 on admission and now elevated to 2 in setting of limited PO intake TODD on CKD  IVF  UA: proteinuria and glucosuria. Neg leuks/nitrates.  Urine cx ___    Hyperkalemia  K+ 5.6-->s/p Lokelma + 1L NS by ED  Resolved    DM2  Monitor FS on CDI  a1c 7    HTN  C/w metoprolol, restarted nifedipine     Hypothyroidism   C/w levothyroxine     PAD   C/w Cilostazol    Dementia   Maintain fall and aspiration precautions  S+S eval completed     On ____, case discussed with Dr. Gurrola. Patient is medically cleared for discharge.     87yo F RH PMHx of DM2, HTN, Advanced Dementia, PAD (on pletal,) Hypothyroidism, and Hx of RLE DVT (in 2011 off AC) p/w fall this morning at around 10:30am followed by unresponsiveness, slurred speech and R facial droop, admitted to [facility] for Stroke evaluation     Stroke rule out   P/w fall, unresponsive episode, R facial droop and RUE weakness c/f stroke.   Cont Cilostazol, Lipitor, no ASA for now per Neuro   CT Head w/o con: No CT evidence of acute intracranial hemorrhage, or acute transcortical infarct. 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  CT angiography neck: Atherosclerotic plaque resulting in approximately 50% stenosis of the proximal right ICA and 60% stenosis of the proximal left ICA. Bilateral vertebral arteries are without stenosis.  CT angiography brain: No large vessel occlusion. 4 mm right cavernous ICA aneurysm. Moderate stenosis of the distal right cavernous ICA  EKG: Sinus Rhythm @ 81bpm QTC 448ms  MRI Brain done: no acute findings  TTE w/ bubble study overall normal   EEG - No epileptic pattern or seizure seen  Neuro following - Cleared from neurology perspective, can follow up as an OP    TODD  Baseline Cr 1.7 per PCP, 1.5 on admission and now elevated to 2 in setting of limited PO intake TODD on CKD  IVF  UA: proteinuria and glucosuria. Neg leuks/nitrates.  Urine cx neg    Hyperkalemia  K+ 5.6-->s/p Lokelma + 1L NS by ED  Resolved    DM2  Monitor FS on CDI  a1c 7    HTN  C/w metoprolol, restarted nifedipine     Hypothyroidism   C/w levothyroxine     PAD   C/w Cilostazol    Dementia   Maintain fall and aspiration precautions  S+S eval completed     On 11/27/21, case discussed with Dr. Gurrola. Patient is medically cleared for discharge.     87yo F RH PMHx of DM2, HTN, Advanced Dementia, PAD (on pletal,) Hypothyroidism, and Hx of RLE DVT (in 2011 off AC) p/w fall this morning at around 10:30am followed by unresponsiveness, slurred speech and R facial droop, admitted to [facility] for Stroke evaluation. Patient underwent stroke v seizure workup which was negative. Unclear what unresponsive episode was secondary to. Per family, patient is back to baseline.     Stroke rule out   P/w fall, unresponsive episode, R facial droop and RUE weakness c/f stroke.   Cont Cilostazol, Lipitor, no ASA for now per Neuro   CT Head w/o con: No CT evidence of acute intracranial hemorrhage, or acute transcortical infarct. 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  CT angiography neck: Atherosclerotic plaque resulting in approximately 50% stenosis of the proximal right ICA and 60% stenosis of the proximal left ICA. Bilateral vertebral arteries are without stenosis.  CT angiography brain: No large vessel occlusion. 4 mm right cavernous ICA aneurysm. Moderate stenosis of the distal right cavernous ICA  EKG: Sinus Rhythm @ 81bpm QTC 448ms  MRI Brain done: no acute findings  TTE w/ bubble study overall normal   EEG - No epileptic pattern or seizure seen  Neuro following - Cleared from neurology perspective, can follow up as an OP    TODD  Baseline Cr 1.7 per PCP, 1.5 on admission and now elevated to 2 in setting of limited PO intake TODD on CKD  IVF  UA: proteinuria and glucosuria. Neg leuks/nitrates.  Urine cx neg    Hyperkalemia  K+ 5.6-->s/p Lokelma + 1L NS by ED  Resolved    DM2  Monitor FS on CDI  a1c 7    HTN  C/w metoprolol, restarted nifedipine     Hypothyroidism   C/w levothyroxine     PAD   C/w Cilostazol    Dementia   Maintain fall and aspiration precautions  S+S eval completed     On 11/27/21, case discussed with Dr. Gurrola. Patient is medically cleared for discharge.

## 2021-11-26 NOTE — DISCHARGE NOTE PROVIDER - NSDCFUADDAPPT_GEN_ALL_CORE_FT
Follow up with your primary care physician for further monitoring in 1-2 weeks. Please call to arrange appointment.  Follow up with Vascular Neurologist Dr. Maldonado upon discharge for continuous monitoring of brain aneurysm.

## 2021-11-26 NOTE — PROGRESS NOTE ADULT - PROBLEM SELECTOR PLAN 2
Baseline Cr 1.7 per PCP, 1.5 on admission and now elevated to 2 in setting of limited PO intake TODD on CKD  - Urine lytes c/w intrinsic kidney dx    - Encourage PO   - Strict I/O's   - Trial IVF overnight and re-check BMP in AM     # Abnormal U/A   [ ] UCx pending, patient reporting increased frequency and urgency

## 2021-11-26 NOTE — DISCHARGE NOTE PROVIDER - NSDCCPCAREPLAN_GEN_ALL_CORE_FT
PRINCIPAL DISCHARGE DIAGNOSIS  Diagnosis: Weakness  Assessment and Plan of Treatment: You were admitted to [facility] with symptoms concerning for stroke. You had imaging studies which were negative for an acute stroke. You were seen by Neurology. You were found to have a brain aneurysm. You should follow up with Neurology outpatient upon discharge. You had an echocardiogram (ultrasound of the heart) which showed no cardiac source of stroke. You had an EEG which showed no seizure activity.      SECONDARY DISCHARGE DIAGNOSES  Diagnosis: TODD (acute kidney injury)  Assessment and Plan of Treatment: You were treated with Intravenous fluids for worsened kidney function. You had urine studies performed to test for a Urinary Tract Infection. You should follow up with your PCP upon discharge for continuous monitoring of routine labs and kidney function.    Diagnosis: DM2 (diabetes mellitus, type 2)  Assessment and Plan of Treatment: Monitor finger sticks pre-meal and bedtime, low salt, fat and carbohydrate diet, minimize glucose intake.  Exercise daily for at least 30 minutes and weight loss.  Follow up with primary care physician and endocrinologist for routine Hemoglobin A1C checks and management.  Follow up with your ophthalmologist for routine yearly vision exams.      Diagnosis: HTN (hypertension)  Assessment and Plan of Treatment: Low sodium and fat diet, continue anti-hypertensive medications, and follow up with primary care physician.

## 2021-11-26 NOTE — DISCHARGE NOTE PROVIDER - CARE PROVIDERS DIRECT ADDRESSES
,[email or web address]

## 2021-11-26 NOTE — DISCHARGE NOTE PROVIDER - CARE PROVIDER_API CALL
Robby Maldonado)  Neurology; Vascular Neurology  [address] [place] [address], [address] [place] [address]  Phone: [phone]  Fax: [phone]  Follow Up Time:    Robby Maldonado)  Neurology; Vascular Neurology  [address] [facility], [address] [place] [address]  Phone: [phone]  Fax: [phone]  Follow Up Time:     Gianfranco Mason)  Neurology  [address] [facility], [address]  [place] [address]  Phone: [phone]  Fax: [phone]  Follow Up Time:

## 2021-11-26 NOTE — PROGRESS NOTE ADULT - SUBJECTIVE AND OBJECTIVE BOX
Merlin Mathew, MD   Hospitalist  Pager #[phone]    PROGRESS NOTE:     Patient is a 86y old  Female who presents with a chief complaint of     SUBJECTIVE / OVERNIGHT EVENTS:   No overnight events  Patient feels well, no complaints, cooperative with exam. Able to move all extremities   Ate breakfast   ADDITIONAL REVIEW OF SYSTEMS:    MEDICATIONS  (STANDING):  atorvastatin 40 milliGRAM(s) Oral at bedtime  cholecalciferol 2000 Unit(s) Oral daily  cilostazol 50 milliGRAM(s) Oral two times a day  cyanocobalamin 1000 MICROGram(s) Oral daily  dextrose 40% Gel 15 Gram(s) Oral once  dextrose 5%. 1000 milliLiter(s) (50 mL/Hr) IV Continuous <Continuous>  dextrose 5%. 1000 milliLiter(s) (100 mL/Hr) IV Continuous <Continuous>  dextrose 50% Injectable 25 Gram(s) IV Push once  dextrose 50% Injectable 12.5 Gram(s) IV Push once  dextrose 50% Injectable 25 Gram(s) IV Push once  glucagon  Injectable 1 milliGRAM(s) IntraMuscular once  heparin   Injectable 5000 Unit(s) SubCutaneous every 8 hours  insulin lispro (ADMELOG) corrective regimen sliding scale   SubCutaneous three times a day before meals  insulin lispro (ADMELOG) corrective regimen sliding scale   SubCutaneous at bedtime  levothyroxine 112 MICROGram(s) Oral daily    MEDICATIONS  (PRN):  OLANZapine Injectable 2.5 milliGRAM(s) IntraMuscular once PRN agitation      CAPILLARY BLOOD GLUCOSE      POCT Blood Glucose.: 158 mg/dL (2021 12:50)  POCT Blood Glucose.: 175 mg/dL (2021 08:39)  POCT Blood Glucose.: 194 mg/dL (2021 21:06)    I&O's Summary      PHYSICAL EXAM:  Vital Signs Last 24 Hrs    T(C): 36.7 (2021 08:24), Max: 36.7 (2021 08:24)  T(F): 98 (2021 08:24), Max: 98 (2021 08:24)  HR: 71 (2021 08:24) (66 - 92)  BP: 105/58 (2021 08:24) (93/38 - 168/78)  BP(mean): --  ABP: --  ABP(mean): --  RR: 16 (2021 08:24) (16 - 18)  SpO2: 98% (2021 08:24) (94% - 100%)    CONSTITUTIONAL: NAD, well-developed elderly woman   RESPIRATORY: Normal respiratory effort; lungs are clear to auscultation bilaterally  CARDIOVASCULAR: Regular rate and rhythm, normal S1 and S2, no murmur/rub/gallop; No lower extremity edema; Peripheral pulses are 2+ bilaterally  ABDOMEN: Nontender to palpation, normoactive bowel sounds, no rebound/guarding; No hepatosplenomegaly  MUSCULOSKELETAL no clubbing or cyanosis of digits; no joint swelling or tenderness to palpation  PSYCH: Calm ,normal affect, answering questions, alert and cooperative     LABS:                        9.9    4.14  )-----------( 223      ( 2021 06:26 )             29.7     2021 06:26    140    |  107    |  38     ----------------------------<  136    4.4     |  21     |  2.03     Ca    9.4        2021 06:26  Phos  3.9       2021 06:26  Mg     2.20      2021 06:26              Urinalysis Basic - ( 2021 16:02 )    Color: Colorless / Appearance: Clear / S.022 / pH: x  Gluc: x / Ketone: Negative  / Bili: Negative / Urobili: <2 mg/dL   Blood: x / Protein: 30 mg/dL / Nitrite: Negative   Leuk Esterase: Negative / RBC: 3 /HPF / WBC 2 /HPF   Sq Epi: x / Non Sq Epi: 1 /HPF / Bacteria: Negative          RADIOLOGY & ADDITIONAL TESTS:  Results Reviewed:   Imaging Personally Reviewed:  Electrocardiogram Personally Reviewed:    COORDINATION OF CARE:  Care Discussed with Consultants/Other Providers [Y/N]: ACP  Prior or Outpatient Records Reviewed [Y/N]:

## 2021-11-26 NOTE — CHART NOTE - NSCHARTNOTEFT_GEN_A_CORE
Case reviewed.    MR Head No Cont (11.24.21 @ 13:37)    IMPRESSION:  Age-appropriate involutional change and microvascular ischemic disease. No evidence of acute stroke. No intracranial hemorrhage appreciated    EEG CLASSIFICATION/SUMMARY:    Abnormal EEG in the awake, drowsy, states due to:  Mild generalized slowing    _____________________________________________________________  CLINICAL IMPRESSION:    Short study, patient removed leads at 11:09pm    Abnormal EEG study in the awake, drowsy, states consistent with:  Mild diffuse or multifocal cerebral dysfunction.  No epileptic pattern or seizure seen.    Recommendations:  [ ] no further neurological workup at this time  [ ] will need outpatient follow up for RAUL aneurysm  can follow up with Dr. Robby Maldonado  [phone]  [ ] otherwise patient may follow up as outpatient with Dr. Vásquez  [phone]    will sign off. please reconsult PRN.    d/w primary team

## 2021-11-26 NOTE — DISCHARGE NOTE PROVIDER - NSDCMRMEDTOKEN_GEN_ALL_CORE_FT
CILOSTAZOL  50 MG TABS: 1 tab(s) orally 2 times a day  JANUVIA  100 MG TABS: 1 tab(s) orally once a day  LEVOTHYROXINE SODIUM  112 MCG TABS: 1 tab(s) orally once a day  METOPROLOL TARTRATE  100 MG TABS: 1 tab(s) orally 2 times a day  NIFEDIPINE ER  90 MG TB24: 1 tab(s) orally once a day  Vitamin B12 1000 mcg oral tablet: 1 tab(s) orally once a day  Vitamin D3 50 mcg (2000 intl units) oral tablet: 1 tab(s) orally once a day   atorvastatin 40 mg oral tablet: 1 tab(s) orally once a day (at bedtime)  CILOSTAZOL  50 MG TABS: 1 tab(s) orally 2 times a day  JANUVIA  100 MG TABS: 1 tab(s) orally once a day  LEVOTHYROXINE SODIUM  112 MCG TABS: 1 tab(s) orally once a day  metoprolol tartrate 50 mg oral tablet: 1 tab(s) orally 2 times a day  NIFEDIPINE ER  90 MG TB24: 1 tab(s) orally once a day  nystatin 100,000 units/g topical powder: 1 application topically 2 times a day  Vitamin B12 1000 mcg oral tablet: 1 tab(s) orally once a day  Vitamin D3 50 mcg (2000 intl units) oral tablet: 1 tab(s) orally once a day   atorvastatin 40 mg oral tablet: 1 tab(s) orally once a day (at bedtime)  CILOSTAZOL  50 MG TABS: 1 tab(s) orally 2 times a day  JANUVIA  100 MG TABS: 1 tab(s) orally once a day  LEVOTHYROXINE SODIUM  112 MCG TABS: 1 tab(s) orally once a day  metoprolol tartrate 50 mg oral tablet: 1 tab(s) orally 2 times a day  NIFEDIPINE ER  90 MG TB24: 1 tab(s) orally once a day  nystatin 100,000 units/g topical powder: 1 application topically 2 times a day  Outpatient PT 2-3X weekly :   Vitamin B12 1000 mcg oral tablet: 1 tab(s) orally once a day  Vitamin D3 50 mcg (2000 intl units) oral tablet: 1 tab(s) orally once a day

## 2021-11-26 NOTE — DISCHARGE NOTE PROVIDER - PROVIDER TOKENS
PROVIDER:[TOKEN:[[id number]]] PROVIDER:[TOKEN:[[id number]]],PROVIDER:[TOKEN:[[id number]]]

## 2021-11-27 VITALS
HEART RATE: 89 BPM | OXYGEN SATURATION: 99 % | SYSTOLIC BLOOD PRESSURE: 146 MMHG | RESPIRATION RATE: 17 BRPM | TEMPERATURE: 98 F | DIASTOLIC BLOOD PRESSURE: 64 MMHG

## 2021-11-27 LAB
ANION GAP SERPL CALC-SCNC: 13 MMOL/L — SIGNIFICANT CHANGE UP (ref 7–14)
BUN SERPL-MCNC: 38 MG/DL — HIGH (ref 7–23)
CALCIUM SERPL-MCNC: 10 MG/DL — SIGNIFICANT CHANGE UP (ref 8.4–10.5)
CHLORIDE SERPL-SCNC: 106 MMOL/L — SIGNIFICANT CHANGE UP (ref 98–107)
CO2 SERPL-SCNC: 20 MMOL/L — LOW (ref 22–31)
CREAT SERPL-MCNC: 1.9 MG/DL — HIGH (ref 0.5–1.3)
CULTURE RESULTS: SIGNIFICANT CHANGE UP
GLUCOSE BLDC GLUCOMTR-MCNC: 142 MG/DL — HIGH (ref 70–99)
GLUCOSE BLDC GLUCOMTR-MCNC: 217 MG/DL — HIGH (ref 70–99)
GLUCOSE SERPL-MCNC: 120 MG/DL — HIGH (ref 70–99)
HCT VFR BLD CALC: 34.6 % — SIGNIFICANT CHANGE UP (ref 34.5–45)
HGB BLD-MCNC: 11.1 G/DL — LOW (ref 11.5–15.5)
MAGNESIUM SERPL-MCNC: 2.3 MG/DL — SIGNIFICANT CHANGE UP (ref 1.6–2.6)
MCHC RBC-ENTMCNC: 31.4 PG — SIGNIFICANT CHANGE UP (ref 27–34)
MCHC RBC-ENTMCNC: 32.1 GM/DL — SIGNIFICANT CHANGE UP (ref 32–36)
MCV RBC AUTO: 98 FL — SIGNIFICANT CHANGE UP (ref 80–100)
NRBC # BLD: 0 /100 WBCS — SIGNIFICANT CHANGE UP
NRBC # FLD: 0 K/UL — SIGNIFICANT CHANGE UP
PHOSPHATE SERPL-MCNC: 4.2 MG/DL — SIGNIFICANT CHANGE UP (ref 2.5–4.5)
PLATELET # BLD AUTO: 230 K/UL — SIGNIFICANT CHANGE UP (ref 150–400)
POTASSIUM SERPL-MCNC: 5 MMOL/L — SIGNIFICANT CHANGE UP (ref 3.5–5.3)
POTASSIUM SERPL-SCNC: 5 MMOL/L — SIGNIFICANT CHANGE UP (ref 3.5–5.3)
RBC # BLD: 3.53 M/UL — LOW (ref 3.8–5.2)
RBC # FLD: 13.8 % — SIGNIFICANT CHANGE UP (ref 10.3–14.5)
SODIUM SERPL-SCNC: 139 MMOL/L — SIGNIFICANT CHANGE UP (ref 135–145)
SPECIMEN SOURCE: SIGNIFICANT CHANGE UP
WBC # BLD: 3.86 K/UL — SIGNIFICANT CHANGE UP (ref 3.8–10.5)
WBC # FLD AUTO: 3.86 K/UL — SIGNIFICANT CHANGE UP (ref 3.8–10.5)

## 2021-11-27 PROCEDURE — 99238 HOSP IP/OBS DSCHRG MGMT 30/<: CPT

## 2021-11-27 RX ORDER — METOPROLOL TARTRATE 50 MG
1 TABLET ORAL
Qty: 0 | Refills: 0 | DISCHARGE

## 2021-11-27 RX ORDER — METOPROLOL TARTRATE 50 MG
1 TABLET ORAL
Qty: 0 | Refills: 0 | DISCHARGE
Start: 2021-11-27

## 2021-11-27 RX ORDER — NYSTATIN CREAM 100000 [USP'U]/G
1 CREAM TOPICAL
Qty: 14 | Refills: 0
Start: 2021-11-27 | End: 2021-12-03

## 2021-11-27 RX ORDER — ATORVASTATIN CALCIUM 80 MG/1
1 TABLET, FILM COATED ORAL
Qty: 30 | Refills: 0
Start: 2021-11-27 | End: 2021-12-26

## 2021-11-27 RX ADMIN — NYSTATIN CREAM 1 APPLICATION(S): 100000 CREAM TOPICAL at 06:29

## 2021-11-27 RX ADMIN — HEPARIN SODIUM 5000 UNIT(S): 5000 INJECTION INTRAVENOUS; SUBCUTANEOUS at 13:20

## 2021-11-27 RX ADMIN — PREGABALIN 1000 MICROGRAM(S): 225 CAPSULE ORAL at 11:12

## 2021-11-27 RX ADMIN — Medication 2000 UNIT(S): at 11:12

## 2021-11-27 RX ADMIN — HEPARIN SODIUM 5000 UNIT(S): 5000 INJECTION INTRAVENOUS; SUBCUTANEOUS at 06:28

## 2021-11-27 RX ADMIN — Medication 2: at 11:41

## 2021-11-27 RX ADMIN — Medication 90 MILLIGRAM(S): at 06:27

## 2021-11-27 RX ADMIN — CILOSTAZOL 50 MILLIGRAM(S): 100 TABLET ORAL at 06:28

## 2021-11-27 RX ADMIN — Medication 50 MILLIGRAM(S): at 06:28

## 2021-11-27 RX ADMIN — Medication 112 MICROGRAM(S): at 06:28

## 2021-11-27 NOTE — PROGRESS NOTE ADULT - NSPROGADDITIONALINFOA_GEN_ALL_CORE
DC home today
Anticipate discharge tomorrow pending clearance from Neurology and stabilization of Cr
Anticipate discharge tomorrow pending clearance from Neurology and stabilization of Cr

## 2021-11-27 NOTE — PROGRESS NOTE ADULT - PROBLEM SELECTOR PROBLEM 7
PAD (peripheral artery disease)

## 2021-11-27 NOTE — PROGRESS NOTE ADULT - PROBLEM SELECTOR PLAN 2
Baseline Cr 1.7 per PCP, 1.5 on admission and now elevated to 2 in setting of limited PO intake TODD on CKD  - Urine lytes c/w intrinsic kidney dx    - Encourage PO   - Strict I/O's   - Trial IVF overnight and re-check BMP in AM     # Abnormal U/A   - UCX: yeast, no sx

## 2021-11-27 NOTE — PROGRESS NOTE ADULT - PROBLEM SELECTOR PROBLEM 2
TODD (acute kidney injury)

## 2021-11-27 NOTE — PROGRESS NOTE ADULT - PROBLEM SELECTOR PLAN 8
Maintain falls and Aspiration precautions

## 2021-11-27 NOTE — PROGRESS NOTE ADULT - SUBJECTIVE AND OBJECTIVE BOX
Merlin Mathew, MD   Hospitalist  Pager #[phone]    PROGRESS NOTE:     Patient is a 86y old  Female who presents with a chief complaint of     SUBJECTIVE / OVERNIGHT EVENTS:   No overnight events  Patient requesting to go home. Denies any dysuria, abdominal pain, urgency or frequency. No complaints.   ADDITIONAL REVIEW OF SYSTEMS:    MEDICATIONS  (STANDING):  atorvastatin 40 milliGRAM(s) Oral at bedtime  cholecalciferol 2000 Unit(s) Oral daily  cilostazol 50 milliGRAM(s) Oral two times a day  cyanocobalamin 1000 MICROGram(s) Oral daily  dextrose 40% Gel 15 Gram(s) Oral once  dextrose 5%. 1000 milliLiter(s) (50 mL/Hr) IV Continuous <Continuous>  dextrose 5%. 1000 milliLiter(s) (100 mL/Hr) IV Continuous <Continuous>  dextrose 50% Injectable 25 Gram(s) IV Push once  dextrose 50% Injectable 12.5 Gram(s) IV Push once  dextrose 50% Injectable 25 Gram(s) IV Push once  glucagon  Injectable 1 milliGRAM(s) IntraMuscular once  heparin   Injectable 5000 Unit(s) SubCutaneous every 8 hours  insulin lispro (ADMELOG) corrective regimen sliding scale   SubCutaneous three times a day before meals  insulin lispro (ADMELOG) corrective regimen sliding scale   SubCutaneous at bedtime  levothyroxine 112 MICROGram(s) Oral daily    MEDICATIONS  (PRN):  OLANZapine Injectable 2.5 milliGRAM(s) IntraMuscular once PRN agitation      CAPILLARY BLOOD GLUCOSE      POCT Blood Glucose.: 158 mg/dL (2021 12:50)  POCT Blood Glucose.: 175 mg/dL (2021 08:39)  POCT Blood Glucose.: 194 mg/dL (2021 21:06)    I&O's Summary      PHYSICAL EXAM:  Vital Signs Last 24 Hrs    T(C): 36.7 (2021 08:24), Max: 36.7 (2021 08:24)  T(F): 98 (2021 08:24), Max: 98 (2021 08:24)  HR: 71 (2021 08:24) (66 - 92)  BP: 105/58 (2021 08:24) (93/38 - 168/78)  BP(mean): --  ABP: --  ABP(mean): --  RR: 16 (2021 08:24) (16 - 18)  SpO2: 98% (2021 08:24) (94% - 100%)    CONSTITUTIONAL: NAD, well-developed elderly woman   RESPIRATORY: Normal respiratory effort; lungs are clear to auscultation bilaterally  CARDIOVASCULAR: Regular rate and rhythm, normal S1 and S2, no murmur/rub/gallop; No lower extremity edema; Peripheral pulses are 2+ bilaterally  ABDOMEN: Nontender to palpation, normoactive bowel sounds, no rebound/guarding; No hepatosplenomegaly  MUSCULOSKELETAL no clubbing or cyanosis of digits; no joint swelling or tenderness to palpation  PSYCH: Calm ,normal affect, answering questions, alert and cooperative     LABS:                        11.1   3.86  )-----------( 230      ( 2021 07:47 )             34.6     2021 07:45    139    |  106    |  38     ----------------------------<  120    5.0     |  20     |  1.90     Ca    10.0       2021 07:45  Phos  4.2       2021 07:45  Mg     2.30      2021 07:45                Urinalysis Basic - ( 2021 16:02 )    Color: Colorless / Appearance: Clear / S.022 / pH: x  Gluc: x / Ketone: Negative  / Bili: Negative / Urobili: <2 mg/dL   Blood: x / Protein: 30 mg/dL / Nitrite: Negative   Leuk Esterase: Negative / RBC: 3 /HPF / WBC 2 /HPF   Sq Epi: x / Non Sq Epi: 1 /HPF / Bacteria: Negative          RADIOLOGY & ADDITIONAL TESTS:  Results Reviewed: Ucx: 10-50K yeast per micro lab   Imaging Personally Reviewed:  Electrocardiogram Personally Reviewed:    COORDINATION OF CARE:  Care Discussed with Consultants/Other Providers [Y/N]: ACP  Prior or Outpatient Records Reviewed [Y/N]:

## 2021-12-01 PROBLEM — Z00.00 ENCOUNTER FOR PREVENTIVE HEALTH EXAMINATION: Status: ACTIVE | Noted: 2021-12-01

## 2021-12-02 PROBLEM — I10 ESSENTIAL (PRIMARY) HYPERTENSION: Chronic | Status: ACTIVE | Noted: 2021-11-21

## 2021-12-02 PROBLEM — I73.9 PERIPHERAL VASCULAR DISEASE, UNSPECIFIED: Chronic | Status: ACTIVE | Noted: 2021-11-21

## 2021-12-02 PROBLEM — E03.9 HYPOTHYROIDISM, UNSPECIFIED: Chronic | Status: ACTIVE | Noted: 2021-11-21

## 2021-12-02 PROBLEM — F03.90 UNSPECIFIED DEMENTIA WITHOUT BEHAVIORAL DISTURBANCE: Chronic | Status: ACTIVE | Noted: 2021-11-21

## 2021-12-02 PROBLEM — Z86.718 PERSONAL HISTORY OF OTHER VENOUS THROMBOSIS AND EMBOLISM: Chronic | Status: ACTIVE | Noted: 2021-11-21

## 2021-12-02 PROBLEM — E11.9 TYPE 2 DIABETES MELLITUS WITHOUT COMPLICATIONS: Chronic | Status: ACTIVE | Noted: 2021-11-21

## 2021-12-14 ENCOUNTER — APPOINTMENT (OUTPATIENT)
Dept: NEUROLOGY | Facility: CLINIC | Age: 86
End: 2021-12-14

## 2022-01-04 ENCOUNTER — APPOINTMENT (OUTPATIENT)
Dept: NEUROLOGY | Facility: CLINIC | Age: 87
End: 2022-01-04

## 2022-02-11 ENCOUNTER — APPOINTMENT (OUTPATIENT)
Dept: INTERNAL MEDICINE | Facility: CLINIC | Age: 87
End: 2022-02-11

## 2022-06-27 NOTE — PHYSICAL THERAPY INITIAL EVALUATION ADULT - RANGE OF MOTION EXAMINATION, REHAB EVAL
Patient Plan:  1. Strongly encourage smoking cessation   - 1800 QUIT NOW is a free resource available to assist you  2. OK to proceed with upcoming procedure with Dr. Linda Reynoso  3.  Follow-up with me as needed no ROM deficits were identified

## 2023-05-31 NOTE — DISCHARGE NOTE NURSING/CASE MANAGEMENT/SOCIAL WORK - CAREGIVER OBTAIN DETAILS
[address] [facility] Occupational Therapy  [address] [place] [address], [address]  Phone: [phone]   Fax: [phone]      Occupational Therapy Daily Treatment Note  Date:  2023    Patient: Parker Parikh   : 1980   MRN: [id number]  Referring Physician:  Dr. Richard Kraft        Medical Diagnosis Information: left hemiplegia       Insurance information: care source    Plan of care sent to provider:      [x]Faxed   []Co-signature    (attempts: 1[] 2[] 3[])       Progress Report: []  Yes  [x]  No     Date Range for reporting period:  Beginnin2023  Ending: end of POC    Progress report due (10 Rx/or 30 days whichever is less): visit #12 or 03    Recertification due (POC duration/ or 90 days whichever is less): visit #12 or 2023    Visit # Insurance Allowable Auth required? Date Range    units  [x]  Yes  []  No Through 23       Latex Allergy:  [x]No      []Yes  Pacemaker:  [] No       [] Yes     Preferred Language for Healthcare:   [x]English       []other:    Pain level:  0/10     SUBJECTIVE:  Pt denies new information on this date.     Functional Disability Index:   Quick dash    OBJECTIVE:       PROM AROM     L R L R   Shoulder Flexion  0-100  23: 0-110 prior to discomfort   23: active scapular elevation and scapular retraction; ~10* shoulder flexion with min A     Elbow Flexion  wnl    23: ~20* active elbow flexion with min A                        Assessment of UE tone/spasticity:      Date: 2023       Shoulder flexors  3  2       Internal rotators  3  2       External rotators  3  1+       Elbow flexors    1       Elbow extensor 3   1       Supinators 3          Pronators           Wrist flexors    2       Wrist extensors 3          Digit flexors 3   3       Digit extensors  3              RESTRICTIONS/PRECAUTIONS: fall risk, hx CVA    Exercises/Interventions:  Treatment focus on improving LUE function as
Pt is A&Ox1, confused and unable to obtain info